# Patient Record
Sex: FEMALE | Race: WHITE | NOT HISPANIC OR LATINO
[De-identification: names, ages, dates, MRNs, and addresses within clinical notes are randomized per-mention and may not be internally consistent; named-entity substitution may affect disease eponyms.]

---

## 2022-02-22 PROBLEM — Z00.00 ENCOUNTER FOR PREVENTIVE HEALTH EXAMINATION: Status: ACTIVE | Noted: 2022-02-22

## 2022-03-31 ENCOUNTER — APPOINTMENT (OUTPATIENT)
Dept: PLASTIC SURGERY | Facility: CLINIC | Age: 22
End: 2022-03-31
Payer: COMMERCIAL

## 2022-03-31 VITALS — HEIGHT: 68 IN | WEIGHT: 140 LBS | BODY MASS INDEX: 21.22 KG/M2

## 2022-03-31 DIAGNOSIS — Z78.9 OTHER SPECIFIED HEALTH STATUS: ICD-10-CM

## 2022-03-31 PROCEDURE — 99203 OFFICE O/P NEW LOW 30 MIN: CPT

## 2022-03-31 RX ORDER — NORETHINDRONE ACETATE AND ETHINYL ESTRADIOL AND FERROUS FUMARATE 1.5-30(21)
KIT ORAL
Refills: 0 | Status: ACTIVE | COMMUNITY

## 2022-03-31 NOTE — PHYSICAL EXAM
[Bra Size: _______] : Bra Size: [unfilled] [de-identified] : well developed female, NAD [de-identified] : NC/AT [de-identified] : supple [de-identified] : unlabored breathing  [de-identified] : Hypertrophic breast b/l L>R with superior deflation, Grade 2 ptosis, nipple sensation intact b/l, no palpable breast nodules or nipple discharge  [de-identified] : RENR [de-identified] : soft, nontender  [de-identified] : shoulder grooving present secondary to bra straps  [de-identified] : FROM in all 4 extremities

## 2022-03-31 NOTE — HISTORY OF PRESENT ILLNESS
[FreeTextEntry1] : 21 yo otherwise healthy F with no significant PMHx who presents today for breast reduction consultation. Patient wears a 34 DD- DDD and c/o dense heavy chest causing neck, shoulder and back pain, shoulder grooving, poor posture and frequent skin rash in the IMF treated multiple times in the past with OTC medications. Patient has seen a chiropractor and a PT for back pain for 2 years with minimal improvement and has been told to consider breast reduction to alleviate symptoms. Also c/o difficulty with working out secondary to heavy chest.\par Denies any personal or family h/o breast disease. No prior mammograms. Denies any breast nodules, nipple discharge or inversion. \par \par Desires a full B- small C\par \par Student - elementary education \par Nonsmoker

## 2022-03-31 NOTE — ASSESSMENT
[FreeTextEntry1] : 21 yo healthy F with symptomatic macromastia who is indicated for reduction mammoplasty. \par \par Patient is a good candidate for breast reduction.  Regarding the procedure, we discussed scarring, poor wound healing, keloid and/or hypertrophic scarring, diminished nipple sensation, diminished ability to breast feed (if appropriate), breast asymmetry, dissatisfaction with the outcome, need for additional surgery and possible nipple loss.  All questions were answered and all risks were understood.\par \par Photos taken with patient permission.\par \par left side larger than right\par \par est 420gm left side\par \par All ?s asnwered\par \par no family history\par doing PT / chiro without improvement\par has seen another plastic surgeon in [place]\par \par Due to COVID 19, pre-visit patient instructions were explained to the patient and their symptoms were checked upon arrival.  \par Masks were used by the health care providers and staff and the examination room was cleaned after the patient visit was completed.\par

## 2022-05-16 ENCOUNTER — OUTPATIENT (OUTPATIENT)
Dept: OUTPATIENT SERVICES | Facility: HOSPITAL | Age: 22
LOS: 1 days | Discharge: HOME | End: 2022-05-16

## 2022-05-16 VITALS
TEMPERATURE: 98 F | HEART RATE: 77 BPM | SYSTOLIC BLOOD PRESSURE: 113 MMHG | DIASTOLIC BLOOD PRESSURE: 70 MMHG | RESPIRATION RATE: 17 BRPM | OXYGEN SATURATION: 99 % | WEIGHT: 139.99 LBS | HEIGHT: 68 IN

## 2022-05-16 DIAGNOSIS — N62 HYPERTROPHY OF BREAST: ICD-10-CM

## 2022-05-16 DIAGNOSIS — Z01.818 ENCOUNTER FOR OTHER PREPROCEDURAL EXAMINATION: ICD-10-CM

## 2022-05-16 DIAGNOSIS — Z98.890 OTHER SPECIFIED POSTPROCEDURAL STATES: Chronic | ICD-10-CM

## 2022-05-16 LAB
ALBUMIN SERPL ELPH-MCNC: 4.9 G/DL — SIGNIFICANT CHANGE UP (ref 3.5–5.2)
ALP SERPL-CCNC: 50 U/L — SIGNIFICANT CHANGE UP (ref 30–115)
ALT FLD-CCNC: 10 U/L — LOW (ref 14–37)
ANION GAP SERPL CALC-SCNC: 14 MMOL/L — SIGNIFICANT CHANGE UP (ref 7–14)
AST SERPL-CCNC: 18 U/L — SIGNIFICANT CHANGE UP (ref 14–37)
BASOPHILS # BLD AUTO: 0.03 K/UL — SIGNIFICANT CHANGE UP (ref 0–0.2)
BASOPHILS NFR BLD AUTO: 0.7 % — SIGNIFICANT CHANGE UP (ref 0–1)
BILIRUB SERPL-MCNC: 0.5 MG/DL — SIGNIFICANT CHANGE UP (ref 0.2–1.2)
BUN SERPL-MCNC: 11 MG/DL — SIGNIFICANT CHANGE UP (ref 10–20)
CALCIUM SERPL-MCNC: 9.7 MG/DL — SIGNIFICANT CHANGE UP (ref 8.5–10.1)
CHLORIDE SERPL-SCNC: 101 MMOL/L — SIGNIFICANT CHANGE UP (ref 98–110)
CO2 SERPL-SCNC: 22 MMOL/L — SIGNIFICANT CHANGE UP (ref 17–32)
CREAT SERPL-MCNC: 0.8 MG/DL — SIGNIFICANT CHANGE UP (ref 0.7–1.5)
EGFR: 108 ML/MIN/1.73M2 — SIGNIFICANT CHANGE UP
EOSINOPHIL # BLD AUTO: 0.06 K/UL — SIGNIFICANT CHANGE UP (ref 0–0.7)
EOSINOPHIL NFR BLD AUTO: 1.3 % — SIGNIFICANT CHANGE UP (ref 0–8)
GLUCOSE SERPL-MCNC: 80 MG/DL — SIGNIFICANT CHANGE UP (ref 70–99)
HCT VFR BLD CALC: 39.6 % — SIGNIFICANT CHANGE UP (ref 37–47)
HGB BLD-MCNC: 12.8 G/DL — SIGNIFICANT CHANGE UP (ref 12–16)
IMM GRANULOCYTES NFR BLD AUTO: 0.2 % — SIGNIFICANT CHANGE UP (ref 0.1–0.3)
LYMPHOCYTES # BLD AUTO: 1.71 K/UL — SIGNIFICANT CHANGE UP (ref 1.2–3.4)
LYMPHOCYTES # BLD AUTO: 38.1 % — SIGNIFICANT CHANGE UP (ref 20.5–51.1)
MCHC RBC-ENTMCNC: 27.7 PG — SIGNIFICANT CHANGE UP (ref 27–31)
MCHC RBC-ENTMCNC: 32.3 G/DL — SIGNIFICANT CHANGE UP (ref 32–37)
MCV RBC AUTO: 85.7 FL — SIGNIFICANT CHANGE UP (ref 81–99)
MONOCYTES # BLD AUTO: 0.42 K/UL — SIGNIFICANT CHANGE UP (ref 0.1–0.6)
MONOCYTES NFR BLD AUTO: 9.4 % — HIGH (ref 1.7–9.3)
NEUTROPHILS # BLD AUTO: 2.26 K/UL — SIGNIFICANT CHANGE UP (ref 1.4–6.5)
NEUTROPHILS NFR BLD AUTO: 50.3 % — SIGNIFICANT CHANGE UP (ref 42.2–75.2)
NRBC # BLD: 0 /100 WBCS — SIGNIFICANT CHANGE UP (ref 0–0)
PLATELET # BLD AUTO: 253 K/UL — SIGNIFICANT CHANGE UP (ref 130–400)
POTASSIUM SERPL-MCNC: 4.4 MMOL/L — SIGNIFICANT CHANGE UP (ref 3.5–5)
POTASSIUM SERPL-SCNC: 4.4 MMOL/L — SIGNIFICANT CHANGE UP (ref 3.5–5)
PROT SERPL-MCNC: 7.7 G/DL — SIGNIFICANT CHANGE UP (ref 6–8)
RBC # BLD: 4.62 M/UL — SIGNIFICANT CHANGE UP (ref 4.2–5.4)
RBC # FLD: 13.5 % — SIGNIFICANT CHANGE UP (ref 11.5–14.5)
SODIUM SERPL-SCNC: 137 MMOL/L — SIGNIFICANT CHANGE UP (ref 135–146)
WBC # BLD: 4.49 K/UL — LOW (ref 4.8–10.8)
WBC # FLD AUTO: 4.49 K/UL — LOW (ref 4.8–10.8)

## 2022-05-16 NOTE — H&P PST ADULT - REASON FOR ADMISSION
21 Y/O FEMALE HERE FOR PRE-ADMISSION SURGICAL TESTING. PATIENT REPORTS NECK AND LOW BACK PAIN D/T LARGE BREASTS X4 YRS. ALSO HAS INDENTATIONS FROM HER BRA STRAPS ON B/L SHOULDERS.  NOW FOR SCHEDULED PROCEDURE.

## 2022-05-16 NOTE — H&P PST ADULT - HISTORY OF PRESENT ILLNESS
PATIENT DENIES CHEST PAIN, SHORTNESS OF BREATH, PALPITATIONS, COUGHING, FEVER, DYSURIA.  CAN WALK UP 6 FLIGHTS OF STEPS WITHOUT SOB.    NO COUGH, FEVER, SORE THROAT, HEADACHE, LOSS OF TASTE OR SMELL. NO KNOWN EXPOSURE TO ANYONE WITH COVID. PATIENT WAS INSTRUCTED TO ISOLATE FROM NOW UNTIL THE SURGERY.    Anesthesia Alert  NO--Difficult Airway  NO--History of neck surgery or radiation  NO--Limited ROM of neck  NO--History of Malignant hyperthermia  NO--Personal or family history of Pseudocholinesterase deficiency  NO--Prior Anesthesia Complication  NO--Latex Allergy  NO--Loose teeth  NO--History of Rheumatoid Arthritis  NO--KELLY  NO-bleeding risk

## 2022-05-18 ENCOUNTER — TRANSCRIPTION ENCOUNTER (OUTPATIENT)
Age: 22
End: 2022-05-18

## 2022-06-02 DIAGNOSIS — M79.2 NEURALGIA AND NEURITIS, UNSPECIFIED: ICD-10-CM

## 2022-06-02 DIAGNOSIS — G89.18 OTHER ACUTE POSTPROCEDURAL PAIN: ICD-10-CM

## 2022-06-02 RX ORDER — GABAPENTIN 300 MG/1
300 CAPSULE ORAL
Qty: 7 | Refills: 0 | Status: ACTIVE | COMMUNITY
Start: 2022-06-02 | End: 1900-01-01

## 2022-06-02 RX ORDER — CELECOXIB 200 MG/1
200 CAPSULE ORAL
Qty: 7 | Refills: 0 | Status: ACTIVE | COMMUNITY
Start: 2022-06-02 | End: 1900-01-01

## 2022-06-03 ENCOUNTER — LABORATORY RESULT (OUTPATIENT)
Age: 22
End: 2022-06-03

## 2022-06-03 NOTE — ASU PATIENT PROFILE, ADULT - NSICDXPASTSURGICALHX_GEN_ALL_CORE_FT
Requested Prescriptions     Pending Prescriptions Disp Refills    temazepam (RESTORIL) 30 mg capsule [Pharmacy Med Name: TEMAZEPAM 30MG      CAP] 30 Cap 2     Sig: TAKE 1 CAPSULE BY MOUTH AT BEDTIME FOR INSOMNIA`    PARoxetine (PAXIL) 40 mg tablet [Pharmacy Med Name: PAROXETINE 40MG     TAB] 90 Tab 1     Sig: TAKE 1 TABLET BY MOUTH ONCE DAILY    clonazePAM (KLONOPIN) 1 mg tablet [Pharmacy Med Name: CLONAZEPAM 1MG      TAB] 90 Tab 1     Sig: TAKE 1/2 TO 1 (ONE-HALF TO ONE) TABLET BY MOUTH TWICE DAILY AND 1 AT BEDTIME     RX refill request from the patient/pharmacy. Patient last seen 09- with labs, and next appt. scheduled for 12-. PAST SURGICAL HISTORY:  History of surgery REMOVAL OF FOREIGN BODY (GLASS) LEFT FOOT

## 2022-06-03 NOTE — ASU PATIENT PROFILE, ADULT - VISION (WITH CORRECTIVE LENSES IF THE PATIENT USUALLY WEARS THEM):
7/7/2017    Jonna [facility] [place] [address]    Dear Jonna:    [facility] wants to ensure your discharge home is safe and you or your loved ones have had all of your questions answered regarding your care after you leave the hospital.    Below is a list of resources and contact information should you have any questions regarding your hospital stay, follow-up instructions, or active medical symptoms.    Questions or Concerns Regarding… Contact   Medical Questions Related to Your Discharge  (7 days a week, 8am-5pm) Contact a Nurse Care Coordinator   [phone]   Medical Questions Not Related to Your Discharge  (24 hours a day / 7 days a week)  Contact the Nurse Health Line   [phone]    Medications or Discharge Instructions Refer to your discharge packet   or contact your [facility] Primary Care Provider   [phone]   Follow-up Appointment(s) Schedule your appointment via Ribbit   or contact Scheduling [phone]   Billing Review your statement via Ribbit  or contact Billing [phone]   Medical Records Review your records via Ribbit   or contact Medical Records [phone]     You may receive a telephone call within two days of discharge. This call is to make certain you understand your discharge instructions and have the opportunity to have any questions answered. You can also easily access your medical information, test results and upcoming appointments via the Ribbit free online health management tool. You can learn more and sign up at [employer]/Ribbit. For assistance setting up your Ribbit account, please call [phone].    Once again, we want to ensure your discharge home is safe and that you have a clear understanding of any next steps in your care. If you have any questions or concerns, please do not hesitate to contact us, we are here for you. Thank you for choosing [facility] for your healthcare needs.    Sincerely,    Your [facility] Healthcare Team          Normal vision: sees adequately in most situations; can see medication labels, newsprint

## 2022-06-03 NOTE — ASU PATIENT PROFILE, ADULT - NSICDXPASTMEDICALHX_GEN_ALL_CORE_FT
PAST MEDICAL HISTORY:  2019 novel coronavirus disease (COVID-19) june 2022/asymptomatic    Celiac disease     Female gynecomastia

## 2022-06-03 NOTE — ASU PATIENT PROFILE, ADULT - FALL HARM RISK - FALL HARM RISK
Dear Derrell,    Here is a summary of your recent test results:  -Hemoglobin is normal.  There is no evidence of anemia.  -LDL(bad) cholesterol level is elevated, and your triglycerides are elevated which can increase your heart disease risk.  A diet high in fat and simple carbohydrates, genetics and being overweight can contribute to this. ADVISE: exercising 150 minutes of aerobic exercise per week (30 minutes for 5 days per week or 50 minutes for 3 days per week are options), eating a low saturated fat/low carbohydrate diet, and omega-3 fatty acids (fish oil) 5672-6893 mg daily are helpful to improve this.  -Glucose (diabetic screening test) is normal.    For additional lab test information, www.testing.com is a very good reference.    Thank you very much for trusting me and [facility].     Have a peaceful day.    Healthy regards,  Kervin Buck MD   No indicators present

## 2022-06-03 NOTE — ASU PATIENT PROFILE, ADULT - FALL HARM RISK - UNIVERSAL INTERVENTIONS
Bed in lowest position, wheels locked, appropriate side rails in place/Call bell, personal items and telephone in reach/Instruct patient to call for assistance before getting out of bed or chair/Non-slip footwear when patient is out of bed/[place] to call system/Physically safe environment - no spills, clutter or unnecessary equipment/Purposeful Proactive Rounding/Room/bathroom lighting operational, light cord in reach

## 2022-06-27 ENCOUNTER — RESULT REVIEW (OUTPATIENT)
Age: 22
End: 2022-06-27

## 2022-06-27 ENCOUNTER — OUTPATIENT (OUTPATIENT)
Dept: OUTPATIENT SERVICES | Facility: HOSPITAL | Age: 22
LOS: 1 days | Discharge: HOME | End: 2022-06-27

## 2022-06-27 ENCOUNTER — TRANSCRIPTION ENCOUNTER (OUTPATIENT)
Age: 22
End: 2022-06-27

## 2022-06-27 ENCOUNTER — APPOINTMENT (OUTPATIENT)
Dept: PLASTIC SURGERY | Facility: AMBULATORY SURGERY CENTER | Age: 22
End: 2022-06-27
Payer: COMMERCIAL

## 2022-06-27 VITALS
RESPIRATION RATE: 16 BRPM | DIASTOLIC BLOOD PRESSURE: 65 MMHG | TEMPERATURE: 100 F | WEIGHT: 139.99 LBS | HEART RATE: 79 BPM | SYSTOLIC BLOOD PRESSURE: 111 MMHG | HEIGHT: 68 IN | OXYGEN SATURATION: 100 %

## 2022-06-27 VITALS
SYSTOLIC BLOOD PRESSURE: 120 MMHG | HEART RATE: 98 BPM | RESPIRATION RATE: 18 BRPM | OXYGEN SATURATION: 96 % | DIASTOLIC BLOOD PRESSURE: 56 MMHG

## 2022-06-27 DIAGNOSIS — Z98.890 OTHER SPECIFIED POSTPROCEDURAL STATES: Chronic | ICD-10-CM

## 2022-06-27 DIAGNOSIS — N62 HYPERTROPHY OF BREAST: ICD-10-CM

## 2022-06-27 PROCEDURE — 19318 BREAST REDUCTION: CPT | Mod: 50

## 2022-06-27 PROCEDURE — 88305 TISSUE EXAM BY PATHOLOGIST: CPT | Mod: 26

## 2022-06-27 RX ORDER — OXYCODONE AND ACETAMINOPHEN 5; 325 MG/1; MG/1
2 TABLET ORAL EVERY 4 HOURS
Refills: 0 | Status: DISCONTINUED | OUTPATIENT
Start: 2022-06-27 | End: 2022-06-27

## 2022-06-27 RX ORDER — CEPHALEXIN 500 MG
1 CAPSULE ORAL
Qty: 10 | Refills: 0
Start: 2022-06-27 | End: 2022-07-01

## 2022-06-27 RX ORDER — ONDANSETRON 8 MG/1
4 TABLET, FILM COATED ORAL ONCE
Refills: 0 | Status: DISCONTINUED | OUTPATIENT
Start: 2022-06-27 | End: 2022-07-12

## 2022-06-27 RX ORDER — TRAMADOL HYDROCHLORIDE 50 MG/1
1 TABLET ORAL
Qty: 20 | Refills: 0
Start: 2022-06-27 | End: 2022-07-01

## 2022-06-27 RX ORDER — OXYCODONE AND ACETAMINOPHEN 5; 325 MG/1; MG/1
1 TABLET ORAL EVERY 4 HOURS
Refills: 0 | Status: DISCONTINUED | OUTPATIENT
Start: 2022-06-27 | End: 2022-06-27

## 2022-06-27 RX ORDER — HYDROMORPHONE HYDROCHLORIDE 2 MG/ML
0.5 INJECTION INTRAMUSCULAR; INTRAVENOUS; SUBCUTANEOUS
Refills: 0 | Status: DISCONTINUED | OUTPATIENT
Start: 2022-06-27 | End: 2022-06-27

## 2022-06-27 RX ORDER — SODIUM CHLORIDE 9 MG/ML
1000 INJECTION, SOLUTION INTRAVENOUS
Refills: 0 | Status: DISCONTINUED | OUTPATIENT
Start: 2022-06-27 | End: 2022-07-12

## 2022-06-27 RX ADMIN — SODIUM CHLORIDE 75 MILLILITER(S): 9 INJECTION, SOLUTION INTRAVENOUS at 17:38

## 2022-06-27 RX ADMIN — OXYCODONE AND ACETAMINOPHEN 1 TABLET(S): 5; 325 TABLET ORAL at 18:44

## 2022-06-27 NOTE — ASU DISCHARGE PLAN (ADULT/PEDIATRIC) - ASU DC SPECIAL INSTRUCTIONSFT
Diet: You may resume your usual diet. Avoid alcohol and excessive salt intake for two weeks following surgery. This will help to minimize swelling.    Medication: Continue Gabapentin and Celebrex twice daily until complete. Take Tylenol 650mg every 6 hours. If pain is still not well controlled, take Tramadol as needed. Take all antibiotics as prescribed. Remember, no Aspirin or NSAIDS (Advil, Motrin, Aleve) as they may increase bruising.    Activity: You may take care of your personal needs as desired, however, no lifting or strenuous activity is allowed for 4 weeks following surgery. Driving is not permitted for at least two weeks.    Wound care: Keep your dressing clean, dry, and intact until seen by MD/PA. Wear the surgical bra which will be provided to you at all times. Do not smoke! It delays wound healing and increases the risk of complications.    Personal Hygiene: Do not get the operative area wet. You are allowed to sponge bathe. Do not remove the surgical bra. No tub soaking.    Things to expect: The operative area may be bruised, swollen, and painful. You may also have temporary numbness which will improve over time.    In case of emergency: call the office any time day or night. Post-operative care will be provided in the office one week following surgery. If you do not already have an appointment, please call during regular office hours to schedule: [phone].     We wish you a pleasant recovery.

## 2022-06-27 NOTE — ASU DISCHARGE PLAN (ADULT/PEDIATRIC) - PATIENT EDUCATION MATERIALS PROVIED
Provider pre-printed instructions given pain management/Provider pre-printed instructions given/Pre-printed instructions given for other (specify)

## 2022-06-27 NOTE — CHART NOTE - NSCHARTNOTEFT_GEN_A_CORE
PACU ANESTHESIA ADMISSION NOTE      Procedure: Breast reduction    Breast reduction      Post op diagnosis:  Macromastia        __x__  Patent Airway    __x__  Full return of protective reflexes    ____  Full recovery from anesthesia / back to baseline     Vitals:   see anesthesia record      Mental Status:  __x__ Awake   _____ Alert   _____ Drowsy   _____ Sedated    Nausea/Vomiting:  __x__ NO  ______Yes,   See Post - Op Orders          Pain Scale (0-10):  _____    Treatment: ____ None    ___x_ See Post - Op/PCA Orders    Post - Operative Fluids:   ____ Oral   __x__ See Post - Op Orders    Plan: Discharge:   __x__Home       _____Floor     _____Critical Care    _____  Other:_________________    Comments:  Uneventful intraoperative course. No anesthesia issues or complications noted. Patient stable upon arrival to PACU. Report given to RN. Discharge when criteria met.

## 2022-06-27 NOTE — ASU DISCHARGE PLAN (ADULT/PEDIATRIC) - NS MD DC FALL RISK RISK
For information on Fall & Injury Prevention, visit: [email or web address] OR  [email or web address] OR  https://www.cdc.gov/steadi/patient.html

## 2022-06-27 NOTE — ASU DISCHARGE PLAN (ADULT/PEDIATRIC) - FOLLOW UP APPOINTMENTS
[facility],  Endoscopy/Ambulatory Surgery [facility]:  [facility]

## 2022-06-27 NOTE — ASU DISCHARGE PLAN (ADULT/PEDIATRIC) - CARE PROVIDER_API CALL
Deshawn Morton)  Plastic Surgery; Surgery of the Hand  [address] [place] [address], [address]  [place] [address]  Phone: [phone]  Fax: [phone]  Follow Up Time:

## 2022-06-27 NOTE — BRIEF OPERATIVE NOTE - NSICDXBRIEFPROCEDURE_GEN_ALL_CORE_FT
PROCEDURES:  Breast reduction 27-Jun-2022 17:17:01  Deshawn Morton  Breast reduction 27-Jun-2022 17:17:07  Deshawn Morton

## 2022-06-29 DIAGNOSIS — K90.41 NON-CELIAC GLUTEN SENSITIVITY: ICD-10-CM

## 2022-06-29 DIAGNOSIS — N62 HYPERTROPHY OF BREAST: ICD-10-CM

## 2022-06-29 DIAGNOSIS — Z86.16 PERSONAL HISTORY OF COVID-19: ICD-10-CM

## 2022-06-30 LAB — SURGICAL PATHOLOGY STUDY: SIGNIFICANT CHANGE UP

## 2022-07-05 ENCOUNTER — APPOINTMENT (OUTPATIENT)
Dept: PLASTIC SURGERY | Facility: CLINIC | Age: 22
End: 2022-07-05

## 2022-07-05 PROBLEM — U07.1 COVID-19: Chronic | Status: ACTIVE | Noted: 2022-06-27

## 2022-07-05 PROBLEM — K90.0 CELIAC DISEASE: Chronic | Status: ACTIVE | Noted: 2022-05-16

## 2022-07-05 PROBLEM — N62 HYPERTROPHY OF BREAST: Chronic | Status: ACTIVE | Noted: 2022-06-27

## 2022-07-05 PROCEDURE — 99024 POSTOP FOLLOW-UP VISIT: CPT

## 2022-07-05 NOTE — PHYSICAL EXAM
[de-identified] : well developed female, NAD [de-identified] : unlabored breathing  [de-identified] : RENR [de-identified] : Bilateral breasts soft with scattered bruising and swelling, incisions healing well, c/d/i, diminished nipple sensation b/l, no erythema or fluid collection

## 2022-07-05 NOTE — HISTORY OF PRESENT ILLNESS
[FreeTextEntry1] : 23 yo otherwise healthy F with no significant PMHx who presents today for breast reduction consultation. Patient wears a 34 DD- DDD and c/o dense heavy chest causing neck, shoulder and back pain, shoulder grooving, poor posture and frequent skin rash in the IMF treated multiple times in the past with OTC medications. Patient has seen a chiropractor and a PT for back pain for 2 years with minimal improvement and has been told to consider breast reduction to alleviate symptoms. Also c/o difficulty with working out secondary to heavy chest.\par Denies any personal or family h/o breast disease. No prior mammograms. Denies any breast nodules, nipple discharge or inversion. \par \par Desires a full B- small C\par \par Student - elementary education \par Nonsmoker \par \par Interval hx (7/5/22). Patient presents today POD#8 s/p BBR. Doing well with normal incisional discomfort. Completed all prescribed medications. Denies any f/c or bleeding.

## 2022-07-05 NOTE — DATA REVIEWED
[FreeTextEntry1] : Pathology             Final\par \par No Documents Attached\par \par \par \par   [facility] Accession Number : [id number]\par Patient:   MEHRAN MAYES\par \par \par Accession:                             [id number]\par \par Collected Date/Time:                   6/27/2022 16:12 EDT\par Received Date/Time:                    6/27/2022 16:49 EDT\par \par Surgical Pathology Report - Auth (Verified)\par \par Specimen(s) Submitted\par 1  Right breast tissue\par Time obtained: 4:12 pm\par 2  Left breast tissue\par Time obtained: 4:13 pm\par \par Final Diagnosis\par 1.  Breast, right tissue and skin, reduction:\par \par - Benign predominantly fibrous breast tissue and overlying skin, both\par without histopathologic abnormality; 263 g.\par \par 2.  Breast, left tissue and skin, reduction:\par - Benign predominantly fibrous breast tissue and overlying skin, both\par without histopathologic abnormality; 537 g.\par Verified by: Sylvester Carter M.D.\par (Electronic Signature)\par Reported on: 06/30/22 11:24 EDT, [facility],\par [address] [place] [id number]\par Phone: [phone]   Fax: [phone]\par _________________________________________________________________\par \par \par Clinical Information\par Bilateral breast reduction\par COVID (-)\par \par Perioperative Diagnosis\par Macromastia\par \par Gross Description\par 1. The specimen is received in formalin, labeled "right breast tissue 277\par g" and consists of multiple fibroadipose tissue, measuring in aggregate,\par 15 x 12 x 3 cm and weighing 263 g.  The specimen is serially sectioned.\par The cut surfaces are grossly unremarkable.  Representative sections are\par submitted.  (2 blocks)\par \par 2. The specimen is received in formalin, labeled " left breast tissue 546\par g" and consists of multiple fibroadipose tissue, measuring in aggregate,\par 18 x 15 x 2 cm and weighing 537 g.  The specimen is serially sectioned.\par The cut surfaces are grossly unremarkable.  Representative sections are\par submitted.  (2 blocks)\par \par Specimen was received and underwent gross examination at [place]\par \par [place] [address].\par \par 06/27/2022 17:11:41 EDT bc\par \par  \par \par  Ordered by: ELBA BALDERAS IV       Collected/Examined: 27Jun2022 04:12PM       \par Verification Required       Stage: Final       \par  Performed at: [facility]       Resulted: 30Jun2022 11:24AM       Last Updated: 30Jun2022 11:24AM       Accession: [id number]

## 2022-07-05 NOTE — ASSESSMENT
[FreeTextEntry1] : 21 yo healthy F with symptomatic macromastia now POD#8 s/p BBR. Doing well. \par \par - all dressings changed\par - may shower\par - continue surgical bra, may switch to a sports bra\par - pathology discussed - benign\par - post-op instructions reviewed and all questions answered\par - f/u next week for suture removal and scar management \par \par Due to COVID 19, pre-visit patient instructions were explained to the patient and their symptoms were checked upon arrival.  \par Masks were used by the health care providers and staff and the examination room was cleaned after the patient visit was completed.\par

## 2022-07-12 ENCOUNTER — APPOINTMENT (OUTPATIENT)
Dept: PLASTIC SURGERY | Facility: CLINIC | Age: 22
End: 2022-07-12

## 2022-07-12 NOTE — ASSESSMENT
[FreeTextEntry1] : 21 yo healthy F with symptomatic macromastia now 2 weeks s/p BBR. Doing well. \par \par - d/c steri strips, aquaphor to entire incision 7-10 days then ok to switch to scar cream. Importance of avoiding sun exposure / SPF reviewed\par - L lateral resolving ecchymosis: heat and massage multiple times a day. Continue sports bra 24/7, can add additional lateral compression\par - Light activity ok, continue with lifting restrictions\par - Breast size and NAC asymmetry: Reassurance that Jordans breasts will continue to decrease in size as normal post op swelling diminishes provided. We will continue to monitor NAC progress over the next few months and can discuss surgical options if necessary at that time. Pt and mother relieved after lengthy conversations with myself and . \par - post-op instructions reviewed and all questions answered\par - f/u 3 weeks. 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 \par \par Due to COVID 19, pre-visit patient instructions were explained to the patient and their symptoms were checked upon arrival.  Masks were used by the health care providers and staff and the examination room was cleaned after the patient visit was completed.\par

## 2022-07-12 NOTE — DATA REVIEWED
[FreeTextEntry1] : Pathology             Final\par \par No Documents Attached\par \par [facility] Accession Number : [id number]\par Patient:   MEHRAN MAYES\par \par Accession:                             [id number]\par \par Collected Date/Time:                   6/27/2022 16:12 EDT\par Received Date/Time:                    6/27/2022 16:49 EDT\par \par Surgical Pathology Report - Auth (Verified)\par \par Specimen(s) Submitted\par 1  Right breast tissue\par Time obtained: 4:12 pm\par 2  Left breast tissue\par Time obtained: 4:13 pm\par \par Final Diagnosis\par 1.  Breast, right tissue and skin, reduction:\par \par - Benign predominantly fibrous breast tissue and overlying skin, both\par without histopathologic abnormality; 263 g.\par \par 2.  Breast, left tissue and skin, reduction:\par - Benign predominantly fibrous breast tissue and overlying skin, both\par without histopathologic abnormality; 537 g.\par Verified by: Sylvester Carter M.D.\par (Electronic Signature)\par Reported on: 06/30/22 11:24 EDT, [facility],\par [address] [place] [address]\par Phone: [phone]   Fax: [phone]\par _________________________________________________________________\par \par \par Clinical Information\par Bilateral breast reduction\par COVID (-)\par \par Perioperative Diagnosis\par Macromastia\par \par Gross Description\par 1. The specimen is received in formalin, labeled "right breast tissue 277\par g" and consists of multiple fibroadipose tissue, measuring in aggregate,\par 15 x 12 x 3 cm and weighing 263 g.  The specimen is serially sectioned.\par The cut surfaces are grossly unremarkable.  Representative sections are\par submitted.  (2 blocks)\par \par 2. The specimen is received in formalin, labeled " left breast tissue 546\par g" and consists of multiple fibroadipose tissue, measuring in aggregate,\par 18 x 15 x 2 cm and weighing 537 g.  The specimen is serially sectioned.\par The cut surfaces are grossly unremarkable.  Representative sections are\par submitted.  (2 blocks)\par \par Specimen was received and underwent gross examination at [place]\par \par [facility], [address] [place] [address].\par \par 06/27/2022 17:11:41 EDT bc\par \par  \par \par  Ordered by: ELBA BALDERAS IV       Collected/Examined: 27Jun2022 04:12PM       \par Verification Required       Stage: Final       \par  Performed at: [facility]       Resulted: 30Jun2022 11:24AM       Last Updated: 30Jun2022 11:24AM       Accession: [id number]

## 2022-07-12 NOTE — HISTORY OF PRESENT ILLNESS
[FreeTextEntry1] : 23 yo otherwise healthy F with no significant PMHx who presents today for breast reduction consultation. Patient wears a 34 DD- DDD and c/o dense heavy chest causing neck, shoulder and back pain, shoulder grooving, poor posture and frequent skin rash in the IMF treated multiple times in the past with OTC medications. Patient has seen a chiropractor and a PT for back pain for 2 years with minimal improvement and has been told to consider breast reduction to alleviate symptoms. Also c/o difficulty with working out secondary to heavy chest.\par Denies any personal or family h/o breast disease. No prior mammograms. Denies any breast nodules, nipple discharge or inversion. \par \par Desires a full B- small C\par \par Student - elementary education \par Nonsmoker \par \par Interval hx (7/5/22). Patient presents today POD#8 s/p BBR. Doing well with normal incisional discomfort. Completed all prescribed medications. Denies any f/c or bleeding.   \par \par Interval hx (7/12/22): Pt 2 weeks s/p BBR. Pt here with mother. Both visibly upset & tearful c/o NAC asymmetry, L higher than R & pt larger than she expected. Pt also c/o TTP on left lateral chest. Denies f/c, bleeding. 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

## 2022-07-12 NOTE — PHYSICAL EXAM
[de-identified] : well developed female, NAD [de-identified] : unlabored breathing  [de-identified] : RENR [de-identified] : Bilateral breasts soft, incisions healing well, c/d/i,  no erythema. Area of resolving ecchymosis left lateral breast, no palpable fluid collection. Breasts with good symmetry, L NAC to IMF 9.75cm, R NAC to IMF 9cm, diminished nipple sensation b/l, [de-identified] : Soft NT ND

## 2022-07-28 ENCOUNTER — APPOINTMENT (OUTPATIENT)
Dept: PLASTIC SURGERY | Facility: CLINIC | Age: 22
End: 2022-07-28

## 2022-07-28 PROCEDURE — 99024 POSTOP FOLLOW-UP VISIT: CPT

## 2022-07-28 NOTE — PHYSICAL EXAM
[de-identified] : unlabored breathing  [de-identified] : RENR [de-identified] : well developed female, NAD [de-identified] : Bilateral breasts soft, incisions healing well, c/d/i,  no erythema or signs of HTS. R breast slight > L, no palpable fluid collection. Breasts with good symmetry, L NAC to IMF 9.75cm, R NAC to IMF 9cm, nipple sensation b/l returning  [de-identified] : Soft NT ND

## 2022-07-28 NOTE — DATA REVIEWED
[FreeTextEntry1] : Pathology             Final\par \par No Documents Attached\par \par [facility] Accession Number : [id number]\par Patient:   MEHRAN MAYES\par \par Accession:                             [id number]\par \par Collected Date/Time:                   6/27/2022 16:12 EDT\par Received Date/Time:                    6/27/2022 16:49 EDT\par \par Surgical Pathology Report - Auth (Verified)\par \par Specimen(s) Submitted\par 1  Right breast tissue\par Time obtained: 4:12 pm\par 2  Left breast tissue\par Time obtained: 4:13 pm\par \par Final Diagnosis\par 1.  Breast, right tissue and skin, reduction:\par \par - Benign predominantly fibrous breast tissue and overlying skin, both\par without histopathologic abnormality; 263 g.\par \par 2.  Breast, left tissue and skin, reduction:\par - Benign predominantly fibrous breast tissue and overlying skin, both\par without histopathologic abnormality; 537 g.\par Verified by: Sylvester Carter M.D.\par (Electronic Signature)\par Reported on: 06/30/22 11:24 EDT, [facility],\par [address] [place] [address]\par Phone: [phone]   Fax: [phone]\par _________________________________________________________________\par \par \par Clinical Information\par Bilateral breast reduction\par COVID (-)\par \par Perioperative Diagnosis\par Macromastia\par \par Gross Description\par 1. The specimen is received in formalin, labeled "right breast tissue 277\par g" and consists of multiple fibroadipose tissue, measuring in aggregate,\par 15 x 12 x 3 cm and weighing 263 g.  The specimen is serially sectioned.\par The cut surfaces are grossly unremarkable.  Representative sections are\par submitted.  (2 blocks)\par \par 2. The specimen is received in formalin, labeled " left breast tissue 546\par g" and consists of multiple fibroadipose tissue, measuring in aggregate,\par 18 x 15 x 2 cm and weighing 537 g.  The specimen is serially sectioned.\par The cut surfaces are grossly unremarkable.  Representative sections are\par submitted.  (2 blocks)\par \par Specimen was received and underwent gross examination at [place]\par \par [facility], [address] [place] [address].\par \par 06/27/2022 17:11:41 EDT bc\par \par  \par \par  Ordered by: ELBA BALDERAS IV       Collected/Examined: 27Jun2022 04:12PM       \par Verification Required       Stage: Final       \par  Performed at: [facility]       Resulted: 30Jun2022 11:24AM       Last Updated: 30Jun2022 11:24AM       Accession: [id number]

## 2022-07-28 NOTE — HISTORY OF PRESENT ILLNESS
[FreeTextEntry1] : 23 yo otherwise healthy F with no significant PMHx who presents today for breast reduction consultation. Patient wears a 34 DD- DDD and c/o dense heavy chest causing neck, shoulder and back pain, shoulder grooving, poor posture and frequent skin rash in the IMF treated multiple times in the past with OTC medications. Patient has seen a chiropractor and a PT for back pain for 2 years with minimal improvement and has been told to consider breast reduction to alleviate symptoms. Also c/o difficulty with working out secondary to heavy chest.\par Denies any personal or family h/o breast disease. No prior mammograms. Denies any breast nodules, nipple discharge or inversion. \par \par Desires a full B- small C\par \par Student - elementary education \par Nonsmoker \par \par Interval hx (7/5/22). Patient presents today POD#8 s/p BBR. Doing well with normal incisional discomfort. Completed all prescribed medications. Denies any f/c or bleeding.   \par \par Interval hx (7/12/22): Pt 2 weeks s/p BBR. Pt here with mother. Both visibly upset & tearful c/o NAC asymmetry, L higher than R & pt larger than she expected. Pt also c/o TTP on left lateral chest. Denies f/c, bleeding. 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 \par \par Interval hx (7/28/22): Pt 4.5 weeks s/p BBR, here for clearance to return to full activity as she starts dance school next week. Here with mother at beside. In better spirits than last visit however c/o R breast now more swollen than left, continues with with c/o NAC asymmetry. Denies any fever/chills/drainage or issues with the incisions.

## 2022-07-28 NOTE — PHYSICAL EXAM
[de-identified] : unlabored breathing  [de-identified] : RENR [de-identified] : well developed female, NAD [de-identified] : Bilateral breasts soft, incisions healing well, c/d/i,  no erythema or signs of HTS. R breast slight > L, no palpable fluid collection. Breasts with good symmetry, L NAC to IMF 9.75cm, R NAC to IMF 9cm, nipple sensation b/l returning  [de-identified] : Soft NT ND

## 2022-07-28 NOTE — DATA REVIEWED
[FreeTextEntry1] : Pathology             Final\par \par No Documents Attached\par \par [facility] Accession Number : [id number]\par Patient:   MEHRAN MAYES\par \par Accession:                             [id number]\par \par Collected Date/Time:                   6/27/2022 16:12 EDT\par Received Date/Time:                    6/27/2022 16:49 EDT\par \par Surgical Pathology Report - Auth (Verified)\par \par Specimen(s) Submitted\par 1  Right breast tissue\par Time obtained: 4:12 pm\par 2  Left breast tissue\par Time obtained: 4:13 pm\par \par Final Diagnosis\par 1.  Breast, right tissue and skin, reduction:\par \par - Benign predominantly fibrous breast tissue and overlying skin, both\par without histopathologic abnormality; 263 g.\par \par 2.  Breast, left tissue and skin, reduction:\par - Benign predominantly fibrous breast tissue and overlying skin, both\par without histopathologic abnormality; 537 g.\par Verified by: Sylvester Carter M.D.\par (Electronic Signature)\par Reported on: 06/30/22 11:24 EDT, [facility],\par [address] [place] [address]\par Phone: [phone]   Fax: [phone]\par _________________________________________________________________\par \par \par Clinical Information\par Bilateral breast reduction\par COVID (-)\par \par Perioperative Diagnosis\par Macromastia\par \par Gross Description\par 1. The specimen is received in formalin, labeled "right breast tissue 277\par g" and consists of multiple fibroadipose tissue, measuring in aggregate,\par 15 x 12 x 3 cm and weighing 263 g.  The specimen is serially sectioned.\par The cut surfaces are grossly unremarkable.  Representative sections are\par submitted.  (2 blocks)\par \par 2. The specimen is received in formalin, labeled " left breast tissue 546\par g" and consists of multiple fibroadipose tissue, measuring in aggregate,\par 18 x 15 x 2 cm and weighing 537 g.  The specimen is serially sectioned.\par The cut surfaces are grossly unremarkable.  Representative sections are\par submitted.  (2 blocks)\par \par Specimen was received and underwent gross examination at [place]\par \par [facility], [address] [place] [address].\par \par 06/27/2022 17:11:41 EDT bc\par \par  \par \par  Ordered by: ELBA BALDERAS IV       Collected/Examined: 27Jun2022 04:12PM       \par Verification Required       Stage: Final       \par  Performed at: [facility]       Resulted: 30Jun2022 11:24AM       Last Updated: 30Jun2022 11:24AM       Accession: [id number]

## 2022-07-28 NOTE — ASSESSMENT
[FreeTextEntry1] : 23 yo healthy F with symptomatic macromastia now 4.5 weeks s/p BBR. Doing well. \par \par -Scar management discussed, continue scar cream\par - R breast swelling: Continue compression bra with abd pad on R lateral chest. S/s of fluid collection and when to call reviewed\par - Cleared to return to full activity without restrictions\par - Breast size and NAC asymmetry: Reassurance provided.  We will continue to monitor NAC progress over the next few months and can discuss surgical options if necessary at that time. Pt and mother relieved after lengthy conversations with myself and . \par - All questions answered\par - f/u 3M\par \par Due to COVID 19, pre-visit patient instructions were explained to the patient and their symptoms were checked upon arrival.  Masks were used by the health care providers and staff and the examination room was cleaned after the patient visit was completed.\par

## 2022-10-06 ENCOUNTER — APPOINTMENT (OUTPATIENT)
Dept: PLASTIC SURGERY | Facility: CLINIC | Age: 22
End: 2022-10-06

## 2022-10-06 PROCEDURE — 99212 OFFICE O/P EST SF 10 MIN: CPT

## 2022-10-06 NOTE — HISTORY OF PRESENT ILLNESS
[FreeTextEntry1] : 23 yo otherwise healthy F with no significant PMHx who presents today for breast reduction consultation. Patient wears a 34 DD- DDD and c/o dense heavy chest causing neck, shoulder and back pain, shoulder grooving, poor posture and frequent skin rash in the IMF treated multiple times in the past with OTC medications. Patient has seen a chiropractor and a PT for back pain for 2 years with minimal improvement and has been told to consider breast reduction to alleviate symptoms. Also c/o difficulty with working out secondary to heavy chest.\par Denies any personal or family h/o breast disease. No prior mammograms. Denies any breast nodules, nipple discharge or inversion. \par \par Desires a full B- small C\par \par Student - elementary education \par Nonsmoker \par \par Interval hx (7/5/22). Patient presents today POD#8 s/p BBR. Doing well with normal incisional discomfort. Completed all prescribed medications. Denies any f/c or bleeding.   \par \par Interval hx (7/12/22): Pt 2 weeks s/p BBR. Pt here with mother. Both visibly upset & tearful c/o NAC asymmetry, L higher than R & pt larger than she expected. Pt also c/o TTP on left lateral chest. Denies f/c, bleeding. 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 \par \par Interval hx (7/28/22): Pt 1 month s/p BBR, here for clearance to return to full activity as she starts dance school next week. \par \par Interval hx (10/6/22). Pt here 3.5 months s/p BBR. Doing well but concerned with breast and NAC asymmetry R>L. Pt prefers the size and shape of left breast but does not like the superior nipple position.

## 2022-10-06 NOTE — ASSESSMENT
[FreeTextEntry1] : 21 yo healthy F with symptomatic macromastia now 3.5 months s/p BBR. Doing well. \par Breast asymmetry.\par \par - continue scar creams daily \par - consider revision \par \par Due to COVID 19, pre-visit patient instructions were explained to the patient and their symptoms were checked upon arrival.  Masks were used by the health care providers and staff and the examination room was cleaned after the patient visit was completed.\par \par \par \par 10/6/2022\par pt seen/examined\par father present (stepped out during exam)\par \par I agree w patient concners:--left NAC higher than right and rigth side slightly larger than left\par \par Will be able to address both of these issues when excise the tender breast scar tissue \par \par Photos taken with patient permission.\par \par All ?s asnwerwed\par \par Pt wishes for March 2023 (due to competitive dance schedule)\par

## 2022-10-06 NOTE — PHYSICAL EXAM
[de-identified] : well developed female, NAD [de-identified] : unlabored breathing  [de-identified] : RENR [de-identified] : Bilateral breasts soft, incisions healing well, c/d/i,  no erythema or fluid collection, asymmetry R>L, left nipple more superior than right nipple, diminished nipple sensation left breast

## 2022-10-06 NOTE — DATA REVIEWED
[FreeTextEntry1] : Pathology             Final\par \par No Documents Attached\par \par [facility] Accession Number : [id number]\par Patient:   MEHRAN MAYES\par \par Accession:                             [id number]\par \par Collected Date/Time:                   6/27/2022 16:12 EDT\par Received Date/Time:                    6/27/2022 16:49 EDT\par \par Surgical Pathology Report - Auth (Verified)\par \par Specimen(s) Submitted\par 1  Right breast tissue\par Time obtained: 4:12 pm\par 2  Left breast tissue\par Time obtained: 4:13 pm\par \par Final Diagnosis\par 1.  Breast, right tissue and skin, reduction:\par \par - Benign predominantly fibrous breast tissue and overlying skin, both\par without histopathologic abnormality; 263 g.\par \par 2.  Breast, left tissue and skin, reduction:\par - Benign predominantly fibrous breast tissue and overlying skin, both\par without histopathologic abnormality; 537 g.\par Verified by: Sylvester Carter M.D.\par (Electronic Signature)\par Reported on: 06/30/22 11:24 EDT, [facility],\par [address] [place] [address]\par Phone: [phone]   Fax: [phone]\par _________________________________________________________________\par \par \par Clinical Information\par Bilateral breast reduction\par COVID (-)\par \par Perioperative Diagnosis\par Macromastia\par \par Gross Description\par 1. The specimen is received in formalin, labeled "right breast tissue 277\par g" and consists of multiple fibroadipose tissue, measuring in aggregate,\par 15 x 12 x 3 cm and weighing 263 g.  The specimen is serially sectioned.\par The cut surfaces are grossly unremarkable.  Representative sections are\par submitted.  (2 blocks)\par \par 2. The specimen is received in formalin, labeled " left breast tissue 546\par g" and consists of multiple fibroadipose tissue, measuring in aggregate,\par 18 x 15 x 2 cm and weighing 537 g.  The specimen is serially sectioned.\par The cut surfaces are grossly unremarkable.  Representative sections are\par submitted.  (2 blocks)\par \par Specimen was received and underwent gross examination at [place]\par \par [facility], [address] [place] [address].\par \par 06/27/2022 17:11:41 EDT bc\par \par  \par \par  Ordered by: ELBA BALDERAS IV       Collected/Examined: 27Jun2022 04:12PM       \par Verification Required       Stage: Final       \par  Performed at: [facility]       Resulted: 30Jun2022 11:24AM       Last Updated: 30Jun2022 11:24AM       Accession: [id number]

## 2023-02-07 ENCOUNTER — APPOINTMENT (OUTPATIENT)
Dept: PLASTIC SURGERY | Facility: CLINIC | Age: 23
End: 2023-02-07
Payer: COMMERCIAL

## 2023-02-07 PROCEDURE — 99212 OFFICE O/P EST SF 10 MIN: CPT

## 2023-02-07 NOTE — HISTORY OF PRESENT ILLNESS
[FreeTextEntry1] : 21 yo otherwise healthy F with no significant PMHx who presents today for breast reduction consultation. Patient wears a 34 DD- DDD and c/o dense heavy chest causing neck, shoulder and back pain, shoulder grooving, poor posture and frequent skin rash in the IMF treated multiple times in the past with OTC medications. Patient has seen a chiropractor and a PT for back pain for 2 years with minimal improvement and has been told to consider breast reduction to alleviate symptoms. Also c/o difficulty with working out secondary to heavy chest.\par Denies any personal or family h/o breast disease. No prior mammograms. Denies any breast nodules, nipple discharge or inversion. \par \par Desires a full B- small C\par \par Student - elementary education \par Nonsmoker \par \par Interval hx (7/5/22). Patient presents today POD#8 s/p BBR. Doing well with normal incisional discomfort. Completed all prescribed medications. Denies any f/c or bleeding.   \par \par Interval hx (7/12/22): Pt 2 weeks s/p BBR. Pt here with mother. Both visibly upset & tearful c/o NAC asymmetry, L higher than R & pt larger than she expected. Pt also c/o TTP on left lateral chest. Denies f/c, bleeding. 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 \par \par Interval hx (7/28/22): Pt 1 month s/p BBR, here for clearance to return to full activity as she starts dance school next week. \par \par Interval hx (10/6/22). Pt here 3.5 months s/p BBR. Doing well but concerned with breast and NAC asymmetry R>L. Pt prefers the size and shape of left breast but does not like the superior nipple position.

## 2023-02-07 NOTE — DATA REVIEWED
[FreeTextEntry1] : Pathology             Final\par \par No Documents Attached\par \par [facility] Accession Number : [id number]\par Patient:   MEHRAN MAYES\par \par Accession:                             [id number]\par \par Collected Date/Time:                   6/27/2022 16:12 EDT\par Received Date/Time:                    6/27/2022 16:49 EDT\par \par Surgical Pathology Report - Auth (Verified)\par \par Specimen(s) Submitted\par 1  Right breast tissue\par Time obtained: 4:12 pm\par 2  Left breast tissue\par Time obtained: 4:13 pm\par \par Final Diagnosis\par 1.  Breast, right tissue and skin, reduction:\par \par - Benign predominantly fibrous breast tissue and overlying skin, both\par without histopathologic abnormality; 263 g.\par \par 2.  Breast, left tissue and skin, reduction:\par - Benign predominantly fibrous breast tissue and overlying skin, both\par without histopathologic abnormality; 537 g.\par Verified by: Sylvester Carter M.D.\par (Electronic Signature)\par Reported on: 06/30/22 11:24 EDT, [facility],\par [address] [place] [address]\par Phone: [phone]   Fax: [phone]\par _________________________________________________________________\par \par \par Clinical Information\par Bilateral breast reduction\par COVID (-)\par \par Perioperative Diagnosis\par Macromastia\par \par Gross Description\par 1. The specimen is received in formalin, labeled "right breast tissue 277\par g" and consists of multiple fibroadipose tissue, measuring in aggregate,\par 15 x 12 x 3 cm and weighing 263 g.  The specimen is serially sectioned.\par The cut surfaces are grossly unremarkable.  Representative sections are\par submitted.  (2 blocks)\par \par 2. The specimen is received in formalin, labeled " left breast tissue 546\par g" and consists of multiple fibroadipose tissue, measuring in aggregate,\par 18 x 15 x 2 cm and weighing 537 g.  The specimen is serially sectioned.\par The cut surfaces are grossly unremarkable.  Representative sections are\par submitted.  (2 blocks)\par \par Specimen was received and underwent gross examination at [place]\par \par [facility], [address] [place] [address].\par \par 06/27/2022 17:11:41 EDT bc\par \par  \par \par  Ordered by: ELBA BALDERAS IV       Collected/Examined: 27Jun2022 04:12PM       \par Verification Required       Stage: Final       \par  Performed at: [facility]       Resulted: 30Jun2022 11:24AM       Last Updated: 30Jun2022 11:24AM       Accession: [id number]

## 2023-02-07 NOTE — PHYSICAL EXAM
[de-identified] : well developed female, NAD [de-identified] : unlabored breathing  [de-identified] : RENR [de-identified] : Bilateral breasts soft, incisions healing well, c/d/i,  no erythema or fluid collection, asymmetry R>L, left nipple more superior than right nipple, diminished nipple sensation left breast

## 2023-02-07 NOTE — ASSESSMENT
[FreeTextEntry1] : 23 yo healthy F with symptomatic macromastia now 3.5 months s/p BBR. Doing well. \par Breast asymmetry.\par \par - continue scar creams daily \par - consider revision \par \par Due to COVID 19, pre-visit patient instructions were explained to the patient and their symptoms were checked upon arrival.  Masks were used by the health care providers and staff and the examination room was cleaned after the patient visit was completed.\par \par \par \par 10/6/2022\par pt seen/examined\par father present (stepped out during exam)\par \par I agree w patient concners:--left NAC higher than right and rigth side slightly larger than left\par \par Will be able to address both of these issues when excise the tender breast scar tissue \par \par Photos taken with patient permission.\par \par All ?s asnwerwed\par \par Pt wishes for March 2023 (due to competitive dance schedule)\par \par \par 2/7/2023\par here w preop ?s\par \par concerns remain similar--left NAC higher, right side slightly larger\par new is right medial halg of NAC w slighly hypertrophic scar tissue\par \par can address all during upcoming revision\par \par Due to COVID 19, pre-visit patient instructions were explained to the patient and their symptoms were checked upon arrival.  \par Masks were used by the health care providers and staff and the examination room was cleaned after the patient visit was completed.\par \par

## 2023-02-10 ENCOUNTER — EMERGENCY (EMERGENCY)
Facility: HOSPITAL | Age: 23
LOS: 1 days | Discharge: HOME | End: 2023-02-10
Attending: INTERNAL MEDICINE | Admitting: INTERNAL MEDICINE
Payer: COMMERCIAL

## 2023-02-10 VITALS
WEIGHT: 132.94 LBS | TEMPERATURE: 98 F | RESPIRATION RATE: 18 BRPM | DIASTOLIC BLOOD PRESSURE: 86 MMHG | HEART RATE: 78 BPM | HEIGHT: 68 IN | SYSTOLIC BLOOD PRESSURE: 134 MMHG | OXYGEN SATURATION: 100 %

## 2023-02-10 DIAGNOSIS — Z98.890 OTHER SPECIFIED POSTPROCEDURAL STATES: Chronic | ICD-10-CM

## 2023-02-10 LAB
ALBUMIN SERPL ELPH-MCNC: 4 G/DL — SIGNIFICANT CHANGE UP (ref 3.3–5)
ALT FLD-CCNC: 20 U/L — SIGNIFICANT CHANGE UP (ref 12–78)
ANION GAP SERPL CALC-SCNC: 5 MMOL/L — SIGNIFICANT CHANGE UP (ref 5–17)
APPEARANCE UR: CLEAR — SIGNIFICANT CHANGE UP
AST SERPL-CCNC: 16 U/L — SIGNIFICANT CHANGE UP (ref 15–37)
BASOPHILS # BLD AUTO: 0.02 K/UL — SIGNIFICANT CHANGE UP (ref 0–0.2)
BASOPHILS NFR BLD AUTO: 0.4 % — SIGNIFICANT CHANGE UP (ref 0–2)
BILIRUB UR-MCNC: NEGATIVE — SIGNIFICANT CHANGE UP
BUN SERPL-MCNC: 12 MG/DL — SIGNIFICANT CHANGE UP (ref 7–23)
CALCIUM SERPL-MCNC: 8.8 MG/DL — SIGNIFICANT CHANGE UP (ref 8.5–10.1)
CHLORIDE SERPL-SCNC: 109 MMOL/L — HIGH (ref 96–108)
CO2 SERPL-SCNC: 25 MMOL/L — SIGNIFICANT CHANGE UP (ref 22–31)
COLOR SPEC: YELLOW — SIGNIFICANT CHANGE UP
CREAT SERPL-MCNC: 0.77 MG/DL — SIGNIFICANT CHANGE UP (ref 0.5–1.3)
DIFF PNL FLD: NEGATIVE — SIGNIFICANT CHANGE UP
EGFR: 112 ML/MIN/1.73M2 — SIGNIFICANT CHANGE UP
EOSINOPHIL # BLD AUTO: 0.11 K/UL — SIGNIFICANT CHANGE UP (ref 0–0.5)
EOSINOPHIL NFR BLD AUTO: 2.1 % — SIGNIFICANT CHANGE UP (ref 0–6)
GLUCOSE SERPL-MCNC: 88 MG/DL — SIGNIFICANT CHANGE UP (ref 70–99)
GLUCOSE UR QL: NEGATIVE — SIGNIFICANT CHANGE UP
HCT VFR BLD CALC: 33.4 % — LOW (ref 34.5–45)
HGB BLD-MCNC: 10.5 G/DL — LOW (ref 11.5–15.5)
IMM GRANULOCYTES NFR BLD AUTO: 0.2 % — SIGNIFICANT CHANGE UP (ref 0–0.9)
KETONES UR-MCNC: NEGATIVE — SIGNIFICANT CHANGE UP
LEUKOCYTE ESTERASE UR-ACNC: NEGATIVE — SIGNIFICANT CHANGE UP
LYMPHOCYTES # BLD AUTO: 2 K/UL — SIGNIFICANT CHANGE UP (ref 1–3.3)
LYMPHOCYTES # BLD AUTO: 38.4 % — SIGNIFICANT CHANGE UP (ref 13–44)
MAGNESIUM SERPL-MCNC: 2.2 MG/DL — SIGNIFICANT CHANGE UP (ref 1.6–2.6)
MCHC RBC-ENTMCNC: 25 PG — LOW (ref 27–34)
MCHC RBC-ENTMCNC: 31.4 GM/DL — LOW (ref 32–36)
MCV RBC AUTO: 79.5 FL — LOW (ref 80–100)
MONOCYTES # BLD AUTO: 0.48 K/UL — SIGNIFICANT CHANGE UP (ref 0–0.9)
MONOCYTES NFR BLD AUTO: 9.2 % — SIGNIFICANT CHANGE UP (ref 2–14)
NEUTROPHILS # BLD AUTO: 2.59 K/UL — SIGNIFICANT CHANGE UP (ref 1.8–7.4)
NEUTROPHILS NFR BLD AUTO: 49.7 % — SIGNIFICANT CHANGE UP (ref 43–77)
NITRITE UR-MCNC: NEGATIVE — SIGNIFICANT CHANGE UP
NRBC # BLD: 0 /100 WBCS — SIGNIFICANT CHANGE UP (ref 0–0)
PH UR: 7 — SIGNIFICANT CHANGE UP (ref 5–8)
PLATELET # BLD AUTO: 234 K/UL — SIGNIFICANT CHANGE UP (ref 150–400)
POTASSIUM SERPL-MCNC: 4.3 MMOL/L — SIGNIFICANT CHANGE UP (ref 3.5–5.3)
POTASSIUM SERPL-SCNC: 4.3 MMOL/L — SIGNIFICANT CHANGE UP (ref 3.5–5.3)
PROT UR-MCNC: 15
RBC # BLD: 4.2 M/UL — SIGNIFICANT CHANGE UP (ref 3.8–5.2)
RBC # FLD: 14.4 % — SIGNIFICANT CHANGE UP (ref 10.3–14.5)
SODIUM SERPL-SCNC: 139 MMOL/L — SIGNIFICANT CHANGE UP (ref 135–145)
SP GR SPEC: 1.01 — SIGNIFICANT CHANGE UP (ref 1.01–1.02)
UROBILINOGEN FLD QL: NEGATIVE — SIGNIFICANT CHANGE UP
WBC # BLD: 5.21 K/UL — SIGNIFICANT CHANGE UP (ref 3.8–10.5)
WBC # FLD AUTO: 5.21 K/UL — SIGNIFICANT CHANGE UP (ref 3.8–10.5)

## 2023-02-10 PROCEDURE — 71045 X-RAY EXAM CHEST 1 VIEW: CPT | Mod: 26

## 2023-02-10 PROCEDURE — 99284 EMERGENCY DEPT VISIT MOD MDM: CPT

## 2023-02-10 RX ORDER — FAMOTIDINE 10 MG/ML
20 INJECTION INTRAVENOUS ONCE
Refills: 0 | Status: COMPLETED | OUTPATIENT
Start: 2023-02-10 | End: 2023-02-10

## 2023-02-10 RX ORDER — ONDANSETRON 8 MG/1
4 TABLET, FILM COATED ORAL ONCE
Refills: 0 | Status: COMPLETED | OUTPATIENT
Start: 2023-02-10 | End: 2023-02-10

## 2023-02-10 RX ORDER — SODIUM CHLORIDE 9 MG/ML
1000 INJECTION INTRAMUSCULAR; INTRAVENOUS; SUBCUTANEOUS ONCE
Refills: 0 | Status: COMPLETED | OUTPATIENT
Start: 2023-02-10 | End: 2023-02-10

## 2023-02-10 RX ADMIN — ONDANSETRON 4 MILLIGRAM(S): 8 TABLET, FILM COATED ORAL at 23:11

## 2023-02-10 RX ADMIN — FAMOTIDINE 20 MILLIGRAM(S): 10 INJECTION INTRAVENOUS at 23:08

## 2023-02-10 RX ADMIN — SODIUM CHLORIDE 2000 MILLILITER(S): 9 INJECTION INTRAMUSCULAR; INTRAVENOUS; SUBCUTANEOUS at 23:08

## 2023-02-10 NOTE — ED PROVIDER NOTE - NS ED ATTENDING STATEMENT MOD
This was a shared visit with the CHUYITA. I reviewed and verified the documentation and independently performed the documented:

## 2023-02-10 NOTE — ED PROVIDER NOTE - CONSTITUTIONAL, MLM
well appearing female, oriented to person, place, time/situation and in no apparent distress. normal...

## 2023-02-10 NOTE — ED ADULT TRIAGE NOTE - CHIEF COMPLAINT QUOTE
Pt c/o feeling feerish, mid back pain and headaches x 1 week, was seen in urgent care, had blood work done, was tested for flu and covid, given zpack for sinus infection, both negative but sts still not getting better.

## 2023-02-10 NOTE — ED PROVIDER NOTE - CLINICAL SUMMARY MEDICAL DECISION MAKING FREE TEXT BOX
22 y/o female H/O celiac disease C/C Patient is feeling general  weakness, abdominal pain back pain and diarrhea. Exam, mild abdominal discomfort, no focal pain, no guarding, no rebound no CVAT, labs result and  CT  normal, stable at discharge and O/P GI referral given

## 2023-02-10 NOTE — ED PROVIDER NOTE - CARE PROVIDER_API CALL
Speedy Campbell ()  Internal Medicine  [address] [place] [address]  Phone: [phone]  Fax: [phone]  Follow Up Time: 1-3 Days

## 2023-02-10 NOTE — ED PROVIDER NOTE - PATIENT PORTAL LINK FT
You can access the FollowMyHealth Patient Portal offered by [facility] by registering at the following website: [email or web address]. By joining [employer]’s FollowMyHealth portal, you will also be able to view your health information using other applications (apps) compatible with our system.

## 2023-02-10 NOTE — ED PROVIDER NOTE - PROGRESS NOTE DETAILS
Shared decision making used in regards to CT abd/pelvis  with patient and mother. They would like to get the CT to r/o intrabadominal pathology for abdominal pain, flank pain and fever. Aware of risk of radiation.

## 2023-02-10 NOTE — ED ADULT NURSE NOTE - OBJECTIVE STATEMENT
23yr old female a&ox4 sitting up in stretcher notes to be having fever and chills with back pain and discomfort x1 week. Pt notes to have been seen at urgent care, and treated to for sinus infection pt notes to not be feeling better still, pt notes nausea as well with low PO intake. Pt denies active sob or chest pain.

## 2023-02-10 NOTE — ED PROVIDER NOTE - OBJECTIVE STATEMENT
Pt c/o feeling feerish, mid back pain and headaches x 1 week, was seen in urgent care, had blood work done, was tested for flu and covid, given zpack for sinus infection, both negative but sts still not getting better.  back pain general, headache 23-year-old female with history of celiac disease presents to the ED with her mother for not feeling well for the past week.  Patient complains of fever, chills, headache, back pain, abdominal pain, diarrhea.  Patient went to her primary care doctor twice and urgent care.  Patient had COVID/flu swab, UA, strep swab and labs which were all unremarkable as per patient.  Patient states she still not feeling well.  Patient is feeling very weak and today having worsening abdominal pain back pain and diarrhea.  Patient states that she had cough and congestion earlier in the week but that has improved.  Patient was prescribed a Z-Sebastian by her primary care doctor for possible sinus infection.  Denies chest pain, shortness of breath, vomiting, urinary symptoms, hematuria.

## 2023-02-11 VITALS
SYSTOLIC BLOOD PRESSURE: 128 MMHG | TEMPERATURE: 98 F | RESPIRATION RATE: 18 BRPM | DIASTOLIC BLOOD PRESSURE: 84 MMHG | HEART RATE: 80 BPM | OXYGEN SATURATION: 98 %

## 2023-02-11 LAB
ALP SERPL-CCNC: 48 U/L — SIGNIFICANT CHANGE UP (ref 40–120)
BILIRUB SERPL-MCNC: 0.3 MG/DL — SIGNIFICANT CHANGE UP (ref 0.2–1.2)
HCG SERPL-ACNC: <1 MIU/ML — SIGNIFICANT CHANGE UP
PROT SERPL-MCNC: 7.4 G/DL — SIGNIFICANT CHANGE UP (ref 6–8.3)
RAPID RVP RESULT: SIGNIFICANT CHANGE UP
SARS-COV-2 RNA SPEC QL NAA+PROBE: SIGNIFICANT CHANGE UP

## 2023-02-11 PROCEDURE — 81001 URINALYSIS AUTO W/SCOPE: CPT

## 2023-02-11 PROCEDURE — 84702 CHORIONIC GONADOTROPIN TEST: CPT

## 2023-02-11 PROCEDURE — 99284 EMERGENCY DEPT VISIT MOD MDM: CPT | Mod: 25

## 2023-02-11 PROCEDURE — 83735 ASSAY OF MAGNESIUM: CPT

## 2023-02-11 PROCEDURE — 71045 X-RAY EXAM CHEST 1 VIEW: CPT

## 2023-02-11 PROCEDURE — 96375 TX/PRO/DX INJ NEW DRUG ADDON: CPT

## 2023-02-11 PROCEDURE — 74177 CT ABD & PELVIS W/CONTRAST: CPT | Mod: ME

## 2023-02-11 PROCEDURE — 36415 COLL VENOUS BLD VENIPUNCTURE: CPT

## 2023-02-11 PROCEDURE — 87086 URINE CULTURE/COLONY COUNT: CPT

## 2023-02-11 PROCEDURE — 80053 COMPREHEN METABOLIC PANEL: CPT

## 2023-02-11 PROCEDURE — 74177 CT ABD & PELVIS W/CONTRAST: CPT | Mod: 26,ME

## 2023-02-11 PROCEDURE — 96374 THER/PROPH/DIAG INJ IV PUSH: CPT | Mod: XU

## 2023-02-11 PROCEDURE — G1004: CPT

## 2023-02-11 PROCEDURE — 85025 COMPLETE CBC W/AUTO DIFF WBC: CPT

## 2023-02-11 PROCEDURE — 0225U NFCT DS DNA&RNA 21 SARSCOV2: CPT

## 2023-02-11 RX ORDER — SODIUM CHLORIDE 9 MG/ML
1000 INJECTION INTRAMUSCULAR; INTRAVENOUS; SUBCUTANEOUS ONCE
Refills: 0 | Status: DISCONTINUED | OUTPATIENT
Start: 2023-02-11 | End: 2023-02-14

## 2023-02-11 NOTE — ED ADULT NURSE REASSESSMENT NOTE - NS ED NURSE REASSESS COMMENT FT1
Assumed care of Pt from previous RN, Pt awaiting CT scan results, currently resting comfortably on stretcher, IVF infusing, mother at bedside, will continue to monitor closely.

## 2023-02-11 NOTE — ED ADULT NURSE REASSESSMENT NOTE - NSIMPLEMENTINTERV_GEN_ALL_ED
Implemented All Universal Safety Interventions:  [place] to call system. Call bell, personal items and telephone within reach. Instruct patient to call for assistance. Room bathroom lighting operational. Non-slip footwear when patient is off stretcher. Physically safe environment: no spills, clutter or unnecessary equipment. Stretcher in lowest position, wheels locked, appropriate side rails in place.

## 2023-02-12 LAB
CULTURE RESULTS: SIGNIFICANT CHANGE UP
SPECIMEN SOURCE: SIGNIFICANT CHANGE UP

## 2023-03-06 RX ORDER — GABAPENTIN 300 MG/1
300 CAPSULE ORAL
Qty: 7 | Refills: 0 | Status: ACTIVE | COMMUNITY
Start: 2023-03-06 | End: 1900-01-01

## 2023-03-06 RX ORDER — CELECOXIB 200 MG/1
200 CAPSULE ORAL
Qty: 7 | Refills: 0 | Status: ACTIVE | COMMUNITY
Start: 2023-03-06 | End: 1900-01-01

## 2023-03-10 NOTE — ASU PATIENT PROFILE, ADULT - NSICDXPASTMEDICALHX_GEN_ALL_CORE_FT
PAST MEDICAL HISTORY:  2019 novel coronavirus disease (COVID-19) june 2022/asymptomatic    2019 novel coronavirus disease (COVID-19) 10/2022    Celiac disease     Female gynecomastia

## 2023-03-10 NOTE — ASU PATIENT PROFILE, ADULT - NSICDXPASTSURGICALHX_GEN_ALL_CORE_FT
PAST SURGICAL HISTORY:  History of surgery REMOVAL OF FOREIGN BODY (GLASS) LEFT FOOT     PAST SURGICAL HISTORY:  History of surgery REMOVAL OF FOREIGN BODY (GLASS) LEFT FOOT    S/P bilateral breast reduction

## 2023-03-13 ENCOUNTER — TRANSCRIPTION ENCOUNTER (OUTPATIENT)
Age: 23
End: 2023-03-13

## 2023-03-13 ENCOUNTER — APPOINTMENT (OUTPATIENT)
Dept: PLASTIC SURGERY | Facility: AMBULATORY SURGERY CENTER | Age: 23
End: 2023-03-13
Payer: COMMERCIAL

## 2023-03-13 ENCOUNTER — OUTPATIENT (OUTPATIENT)
Dept: INPATIENT UNIT | Facility: HOSPITAL | Age: 23
LOS: 1 days | Discharge: HOME | End: 2023-03-13
Payer: COMMERCIAL

## 2023-03-13 ENCOUNTER — RESULT REVIEW (OUTPATIENT)
Age: 23
End: 2023-03-13

## 2023-03-13 VITALS
RESPIRATION RATE: 18 BRPM | TEMPERATURE: 98 F | SYSTOLIC BLOOD PRESSURE: 113 MMHG | OXYGEN SATURATION: 98 % | HEIGHT: 68 IN | HEART RATE: 80 BPM | WEIGHT: 130.07 LBS | DIASTOLIC BLOOD PRESSURE: 58 MMHG

## 2023-03-13 VITALS
RESPIRATION RATE: 18 BRPM | SYSTOLIC BLOOD PRESSURE: 110 MMHG | HEART RATE: 76 BPM | DIASTOLIC BLOOD PRESSURE: 63 MMHG | OXYGEN SATURATION: 98 %

## 2023-03-13 DIAGNOSIS — Z98.890 OTHER SPECIFIED POSTPROCEDURAL STATES: Chronic | ICD-10-CM

## 2023-03-13 DIAGNOSIS — L91.0 HYPERTROPHIC SCAR: ICD-10-CM

## 2023-03-13 PROCEDURE — 88305 TISSUE EXAM BY PATHOLOGIST: CPT

## 2023-03-13 PROCEDURE — 12031 INTMD RPR S/A/T/EXT 2.5 CM/<: CPT

## 2023-03-13 PROCEDURE — 88305 TISSUE EXAM BY PATHOLOGIST: CPT | Mod: 26

## 2023-03-13 PROCEDURE — 11402 EXC TR-EXT B9+MARG 1.1-2 CM: CPT

## 2023-03-13 RX ORDER — OXYCODONE HYDROCHLORIDE 5 MG/1
5 TABLET ORAL ONCE
Refills: 0 | Status: DISCONTINUED | OUTPATIENT
Start: 2023-03-13 | End: 2023-03-13

## 2023-03-13 RX ORDER — HYDROMORPHONE HYDROCHLORIDE 2 MG/ML
0.5 INJECTION INTRAMUSCULAR; INTRAVENOUS; SUBCUTANEOUS
Refills: 0 | Status: DISCONTINUED | OUTPATIENT
Start: 2023-03-13 | End: 2023-03-13

## 2023-03-13 RX ORDER — SODIUM CHLORIDE 9 MG/ML
1000 INJECTION, SOLUTION INTRAVENOUS
Refills: 0 | Status: DISCONTINUED | OUTPATIENT
Start: 2023-03-13 | End: 2023-03-13

## 2023-03-13 RX ORDER — NORETHINDRONE AND ETHINYL ESTRADIOL 0.4-0.035
1 KIT ORAL
Qty: 0 | Refills: 0 | DISCHARGE

## 2023-03-13 RX ORDER — ONDANSETRON 8 MG/1
4 TABLET, FILM COATED ORAL ONCE
Refills: 0 | Status: COMPLETED | OUTPATIENT
Start: 2023-03-13 | End: 2023-03-13

## 2023-03-13 RX ORDER — SERTRALINE 25 MG/1
1 TABLET, FILM COATED ORAL
Qty: 0 | Refills: 0 | DISCHARGE

## 2023-03-13 RX ORDER — TRAMADOL HYDROCHLORIDE 50 MG/1
1 TABLET ORAL
Qty: 10 | Refills: 0
Start: 2023-03-13 | End: 2023-03-15

## 2023-03-13 RX ORDER — SCOPALAMINE 1 MG/3D
1 PATCH, EXTENDED RELEASE TRANSDERMAL
Refills: 0 | Status: DISCONTINUED | OUTPATIENT
Start: 2023-03-13 | End: 2023-03-13

## 2023-03-13 RX ADMIN — ONDANSETRON 4 MILLIGRAM(S): 8 TABLET, FILM COATED ORAL at 18:14

## 2023-03-13 RX ADMIN — SODIUM CHLORIDE 100 MILLILITER(S): 9 INJECTION, SOLUTION INTRAVENOUS at 17:28

## 2023-03-13 RX ADMIN — HYDROMORPHONE HYDROCHLORIDE 0.5 MILLIGRAM(S): 2 INJECTION INTRAMUSCULAR; INTRAVENOUS; SUBCUTANEOUS at 18:06

## 2023-03-13 RX ADMIN — SCOPALAMINE 1 PATCH: 1 PATCH, EXTENDED RELEASE TRANSDERMAL at 13:02

## 2023-03-13 NOTE — CHART NOTE - NSCHARTNOTEFT_GEN_A_CORE
PACU ANESTHESIA ADMISSION NOTE      Procedure: Breast revision surgery  excision of bilateral breast scar      Post op diagnosis:  Scar of breast        __x__  Patent Airway    ___x_  Full return of protective reflexes    ___x_  Full recovery from anesthesia / back to baseline     Vitals:   T:    97.6       R:   11               BP:  126/78                Sat: 100%                  P: 100      Mental Status:  ___x_ Awake   _____ Alert   _____ Drowsy   _____ Sedated    Nausea/Vomiting:  ____ NO  ______Yes,   See Post - Op Orders          Pain Scale (0-10):  _____    Treatment: ____ None    ___x_ See Post - Op/PCA Orders    Post - Operative Fluids:   ____ Oral   __x__ See Post - Op Orders    Plan: Discharge:   __x__Home       _____Floor     _____Critical Care    _____  Other:_________________    Comments: Pt tolerated procedure well, no anesthesia related complications. Care of pt endorsed to PACU, report given to PACU RN. Discharge when criteria are met.

## 2023-03-13 NOTE — ASU DISCHARGE PLAN (ADULT/PEDIATRIC) - PAIN MANAGEMENT
Doxycycline, Tramadol for severe pain/Take over the counter pain medication/Prescriptions [facility]

## 2023-03-13 NOTE — ASU DISCHARGE PLAN (ADULT/PEDIATRIC) - CARE PROVIDER_API CALL
Deshawn Morton)  Plastic Surgery; Surgery of the Hand  [address] [place] [address], [address]  [place] [address]  Phone: [phone]  Fax: [phone]  Follow Up Time:

## 2023-03-13 NOTE — BRIEF OPERATIVE NOTE - NSICDXBRIEFPROCEDURE_GEN_ALL_CORE_FT
PROCEDURES:  Breast revision surgery 13-Mar-2023 17:24:44 excision of bilateral breast scar Saranya Neff

## 2023-03-13 NOTE — ASU DISCHARGE PLAN (ADULT/PEDIATRIC) - ASU DC SPECIAL INSTRUCTIONSFT
Keep surgical site clean and dry.   Do not remove any dressings or get them wet.   Sleep on your back with head elevated to reduce swelling.   Wear surgical bra at all times and do not remove it.   Rest, no lifting.

## 2023-03-14 ENCOUNTER — NON-APPOINTMENT (OUTPATIENT)
Age: 23
End: 2023-03-14

## 2023-03-16 LAB — SURGICAL PATHOLOGY STUDY: SIGNIFICANT CHANGE UP

## 2023-03-17 DIAGNOSIS — N65.1 DISPROPORTION OF RECONSTRUCTED BREAST: ICD-10-CM

## 2023-03-17 DIAGNOSIS — L90.5 SCAR CONDITIONS AND FIBROSIS OF SKIN: ICD-10-CM

## 2023-03-17 DIAGNOSIS — Z86.16 PERSONAL HISTORY OF COVID-19: ICD-10-CM

## 2023-03-20 ENCOUNTER — APPOINTMENT (OUTPATIENT)
Dept: PLASTIC SURGERY | Facility: CLINIC | Age: 23
End: 2023-03-20
Payer: COMMERCIAL

## 2023-03-20 DIAGNOSIS — N62 HYPERTROPHY OF BREAST: ICD-10-CM

## 2023-03-20 PROBLEM — U07.1 COVID-19: Chronic | Status: ACTIVE | Noted: 2023-03-10

## 2023-03-20 PROCEDURE — 99024 POSTOP FOLLOW-UP VISIT: CPT

## 2023-03-20 NOTE — PHYSICAL EXAM
[de-identified] : well developed female, NAD [de-identified] : unlabored breathing  [de-identified] : RENR [de-identified] : Bilateral breasts soft, incisions healing well, c/d/i,  no erythema or fluid collection, improved size symmetry, left inferior breast contour irregularity

## 2023-03-20 NOTE — ASSESSMENT
[FreeTextEntry1] : 22 yo healthy F with symptomatic macromastia s/p BBR. Doing well. \par Breast asymmetry and painful scars. \par \par Now POD#7 s/p revision of BBR and excision of painful scars. \par \par - dressings changed\par - may shower\par - daily Aquaphor and Scarguard in 1 week \par - post-op instructions reviewed and all questions were answered\par - f/u 2 weeks with Dr. Morton. \par \par Due to COVID 19, pre-visit patient instructions were explained to the patient and their symptoms were checked upon arrival.  Masks were used by the health care providers and staff and the examination room was cleaned after the patient visit was completed.\par \par \par \par \par \par \par \par \par

## 2023-03-20 NOTE — DATA REVIEWED
[FreeTextEntry1] : Pathology             Final\par \par No Documents Attached\par \par \par \par   [facility] Accession Number : [id number]\par Patient:   MEHRAN MAYES\par \par \par Accession:                             [id number]\par \par Collected Date/Time:                   3/13/2023 16:40 EDT\par Received Date/Time:                    3/14/2023 08:31 EDT\par \par Surgical Pathology Report - Auth (Verified)\par \par Specimen(s) Submitted\par 1  Right breast scar tissue\par Time obtained: 4:40 pm\par Cold ischemic time <1 hour\par 2  Left breast scar tissue\par Time obtained: 4:45 pm\par Cold ischemic time <1 hour\par \par Final Diagnosis\par \par 1  Right breast scar tissue, excision:\par -Skin showing dermal fibrosis, and focal scar with focal foreign body type\par multinucleate giant cell reaction.\par -Benign fatty breast tissue showing non-proliferative fibrocystic change\par \par 2  Left breast scar tissue, excision:\par -Skin showing dermal fibrosis and focal scar.\par -Benign fatty breast tissue showing non-proliferative fibrocystic change\par Verified by: Patrica Ramachandran M.D.\par (Electronic Signature)\par Reported on: 03/16/23 16:11 EDT, [facility],\par [facility] [place] [address]\par Phone: [phone]   Fax: [phone]\par _________________________________________________________________\par \par \par Clinical Information\par Excision bilateral breast scar tissue\par prior breast reduction\par \par Perioperative Diagnosis\par S/P bilateral breast reduction\par \par Gross Description\par 1.  The specimen is received fresh labeled "right breast the scar tissue".\par It consists of multiple fragments of tan-white skin and subcutaneous\par tissue measuring 10 x 6 x 1.5 cm in aggregate.  There are 2 scars\par identified measuring 1.5 x 0.3 cm., 1 x 0.2 cm. Representative sections\par are submitted.\par (2 blocks)\par \par 2.  The specimen is received fresh labeled "left breast the scar tissue".\par It consists of multiple fragments of tan-white skin and underlying\par soft tissue measuring 7 x 4 x 2 cm in aggregate.  There are multiple\par \par scars identified measuring 4.5 x 0.3 cm, 1.3 x 0.3 cm, 0.7 x 0.3 cm.\par Representative sections are submitted.  (2 blocks)\par \par Specimen was received and underwent gross examination at [facility], [address] [place] [address].\par \par 03/14/2023 19:56:07 EDT   \par \par  \par \par  Ordered by: ELBA BALDERAS IV       Collected/Examined: 13Mar2023 04:40PM       \par Verification Required       Stage: Final       \par  Performed at: [facility]       Resulted: 16Mar2023 04:11PM       Last Updated: 16Mar2023 04:11PM       Accession: [id number]

## 2023-03-20 NOTE — HISTORY OF PRESENT ILLNESS
[FreeTextEntry1] : 21 yo otherwise healthy F with no significant PMHx who presents today for breast reduction consultation. Patient wears a 34 DD- DDD and c/o dense heavy chest causing neck, shoulder and back pain, shoulder grooving, poor posture and frequent skin rash in the IMF treated multiple times in the past with OTC medications. Patient has seen a chiropractor and a PT for back pain for 2 years with minimal improvement and has been told to consider breast reduction to alleviate symptoms. Also c/o difficulty with working out secondary to heavy chest.\par Denies any personal or family h/o breast disease. No prior mammograms. Denies any breast nodules, nipple discharge or inversion. \par \par Desires a full B- small C\par \par Student - elementary education \par Nonsmoker \par \par Interval hx (7/5/22). Patient presents today POD#8 s/p BBR. Doing well with normal incisional discomfort. Completed all prescribed medications. Denies any f/c or bleeding.   \par \par Interval hx (7/12/22): Pt 2 weeks s/p BBR. Pt here with mother. Both visibly upset & tearful c/o NAC asymmetry, L higher than R & pt larger than she expected. Pt also c/o TTP on left lateral chest. Denies f/c, bleeding. 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 \par \par Interval hx (7/28/22): Pt 1 month s/p BBR, here for clearance to return to full activity as she starts dance school next week. \par \par Interval hx (10/6/22). Pt here 3.5 months s/p BBR. Doing well but concerned with breast and NAC asymmetry R>L. Pt prefers the size and shape of left breast but does not like the superior nipple position. \par \par Interval hx (3/20/23). Pt here POD#7 s/p revision of BBR and excision of painful scars. Doing well with no significant pain, f/c or drainage. Completed all prescribed medications.

## 2023-04-04 ENCOUNTER — APPOINTMENT (OUTPATIENT)
Dept: PLASTIC SURGERY | Facility: CLINIC | Age: 23
End: 2023-04-04
Payer: COMMERCIAL

## 2023-04-04 PROCEDURE — 99212 OFFICE O/P EST SF 10 MIN: CPT

## 2023-04-04 NOTE — HISTORY OF PRESENT ILLNESS
[FreeTextEntry1] : 23 yo otherwise healthy F with no significant PMHx who presents today for breast reduction consultation. Patient wears a 34 DD- DDD and c/o dense heavy chest causing neck, shoulder and back pain, shoulder grooving, poor posture and frequent skin rash in the IMF treated multiple times in the past with OTC medications. Patient has seen a chiropractor and a PT for back pain for 2 years with minimal improvement and has been told to consider breast reduction to alleviate symptoms. Also c/o difficulty with working out secondary to heavy chest.\par Denies any personal or family h/o breast disease. No prior mammograms. Denies any breast nodules, nipple discharge or inversion. \par \par Desires a full B- small C\par \par Student - elementary education \par Nonsmoker \par \par Interval hx (7/5/22). Patient presents today POD#8 s/p BBR. Doing well with normal incisional discomfort. Completed all prescribed medications. Denies any f/c or bleeding.   \par \par Interval hx (7/12/22): Pt 2 weeks s/p BBR. Pt here with mother. Both visibly upset & tearful c/o NAC asymmetry, L higher than R & pt larger than she expected. Pt also c/o TTP on left lateral chest. Denies f/c, bleeding. 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 \par \par Interval hx (7/28/22): Pt 1 month s/p BBR, here for clearance to return to full activity as she starts dance school next week. \par \par Interval hx (10/6/22). Pt here 3.5 months s/p BBR. Doing well but concerned with breast and NAC asymmetry R>L. Pt prefers the size and shape of left breast but does not like the superior nipple position. \par \par Interval hx (3/20/23). Pt here POD#7 s/p revision of BBR and excision of painful scars. Doing well with no significant pain, f/c or drainage. Completed all prescribed medications.

## 2023-04-04 NOTE — ASSESSMENT
[FreeTextEntry1] : 24 yo healthy F with symptomatic macromastia s/p BBR. Doing well. \par Breast asymmetry and painful scars. \par \par Now POD#7 s/p revision of BBR and excision of painful scars. \par \par - dressings changed\par - may shower\par - daily Aquaphor and Scarguard in 1 week \par - post-op instructions reviewed and all questions were answered\par - f/u 2 weeks with Dr. Morton. \par \par Due to COVID 19, pre-visit patient instructions were explained to the patient and their symptoms were checked upon arrival.  Masks were used by the health care providers and staff and the examination room was cleaned after the patient visit was completed.\par \par 4/4/2023\par seen w father and Trudi\par doing well overall\par slight resolving bruising left inferior breast\par normal postop swelling\par right side not as swollen, as expected\par \par stop aquafor\par silicon patch\par resume low level dance in 2 weeks, gradually increasing to normal 10-14 days thereafter\par f/u 2 months\par \par pt happy overall\par took "few pain pills" first two days after surgery, none since\par \par \par \par \par \par \par \par \par \par \par

## 2023-04-04 NOTE — PHYSICAL EXAM
[de-identified] : well developed female, NAD [de-identified] : unlabored breathing  [de-identified] : RENR [de-identified] : Bilateral breasts soft, incisions healing well, c/d/i,  no erythema or fluid collection, improved size symmetry, left inferior breast contour irregularity

## 2023-04-04 NOTE — DATA REVIEWED
[FreeTextEntry1] : Pathology             Final\par \par No Documents Attached\par \par \par \par   [facility] Accession Number : [id number]\par Patient:   MEHRAN MAYES\par \par \par Accession:                             [id number]\par \par Collected Date/Time:                   3/13/2023 16:40 EDT\par Received Date/Time:                    3/14/2023 08:31 EDT\par \par Surgical Pathology Report - Auth (Verified)\par \par Specimen(s) Submitted\par 1  Right breast scar tissue\par Time obtained: 4:40 pm\par Cold ischemic time <1 hour\par 2  Left breast scar tissue\par Time obtained: 4:45 pm\par Cold ischemic time <1 hour\par \par Final Diagnosis\par \par 1  Right breast scar tissue, excision:\par -Skin showing dermal fibrosis, and focal scar with focal foreign body type\par multinucleate giant cell reaction.\par -Benign fatty breast tissue showing non-proliferative fibrocystic change\par \par 2  Left breast scar tissue, excision:\par -Skin showing dermal fibrosis and focal scar.\par -Benign fatty breast tissue showing non-proliferative fibrocystic change\par Verified by: Patrica Ramachandran M.D.\par (Electronic Signature)\par Reported on: 03/16/23 16:11 EDT, [facility],\par [facility] [place] [address]\par Phone: [phone]   Fax: [phone]\par _________________________________________________________________\par \par \par Clinical Information\par Excision bilateral breast scar tissue\par prior breast reduction\par \par Perioperative Diagnosis\par S/P bilateral breast reduction\par \par Gross Description\par 1.  The specimen is received fresh labeled "right breast the scar tissue".\par It consists of multiple fragments of tan-white skin and subcutaneous\par tissue measuring 10 x 6 x 1.5 cm in aggregate.  There are 2 scars\par identified measuring 1.5 x 0.3 cm., 1 x 0.2 cm. Representative sections\par are submitted.\par (2 blocks)\par \par 2.  The specimen is received fresh labeled "left breast the scar tissue".\par It consists of multiple fragments of tan-white skin and underlying\par soft tissue measuring 7 x 4 x 2 cm in aggregate.  There are multiple\par \par scars identified measuring 4.5 x 0.3 cm, 1.3 x 0.3 cm, 0.7 x 0.3 cm.\par Representative sections are submitted.  (2 blocks)\par \par Specimen was received and underwent gross examination at [facility], [address] [place] [address].\par \par 03/14/2023 19:56:07 EDT   \par \par  \par \par  Ordered by: ELBA BALDERAS IV       Collected/Examined: 13Mar2023 04:40PM       \par Verification Required       Stage: Final       \par  Performed at: [facility]       Resulted: 16Mar2023 04:11PM       Last Updated: 16Mar2023 04:11PM       Accession: [id number]

## 2023-05-15 NOTE — ED ADULT TRIAGE NOTE - BP NONINVASIVE SYSTOLIC (MM HG)
Internal Medicine Progress Note  Patient: Alis Amado  Age/sex: 73 y o  male  Medical Record #: [id number]      ASSESSMENT/PLAN: (Interval History)  Alis Amado is seen and examined and management for following issues:    S/p posterior C4-T1 decompressive laminectomy and instrumented fusion  • No collar needed  • Incision healing well     HTN  • Home: Lotrel 5-20 daily  • Here: Amlopdipine 5mg dailyLlisinopril 20mg daily  • No changes today     DM type 2   • HA1C 6   • Home: Amaryl 2mg daily/Pioglitazone-Metformin  2x daily  • Here: Metformin 500 mg BID  • Continue QID Accuchecks/SSI and DM diet  • No changes today     Obesity  • BMI 39 5  • Recommended ongoing attempts at weight loss  Discharge date:  Team      The above assessment and plan was reviewed and updated as determined by my evaluation of the patient on 5/15/2023      Labs:   Results from last 7 days   Lab Units 05/12/23  1102 05/11/23  0920   WBC Thousand/uL 13 09* 18 52*   HEMOGLOBIN g/dL 13 7 14 1   HEMATOCRIT % 40 8 43 1   PLATELETS Thousands/uL 286 293     Results from last 7 days   Lab Units 05/11/23  0557 05/10/23  1000   SODIUM mmol/L 135 138   POTASSIUM mmol/L 4 1 4 1   CHLORIDE mmol/L 104 107   CO2 mmol/L 25 26   BUN mg/dL 23 26*   CREATININE mg/dL 1 07 1 03   CALCIUM mg/dL 10 1 10 4*         Results from last 7 days   Lab Units 05/11/23  0759 05/10/23  1000   INR  1 11 0 99     Results from last 7 days   Lab Units 05/15/23  1046 05/15/23  0621 05/14/23  2132   POC GLUCOSE mg/dl 193* 150* 166*       Review of Scheduled Meds:  Current Facility-Administered Medications   Medication Dose Route Frequency Provider Last Rate   • acetaminophen  975 mg Oral [facility] Traci Mancia MD     • amLODIPine  5 mg Oral Daily Traci Mancia MD      And   • lisinopril  20 mg Oral Daily Traci Mancia MD     • bisacodyl  10 mg Rectal Daily PRN Traci Mancia MD     • calcium carbonate  750 mg Oral TID PRN Traci Mancia MD     • docusate sodium  100 mg Oral BID Nohelia Martins MD     • heparin (porcine)  5,000 Units Subcutaneous [facility] Nohelia Martisn MD     • insulin lispro  1-5 Units Subcutaneous HS Nohelia Martins MD     • insulin lispro  1-5 Units Subcutaneous 4x Daily (AC & HS) Areli Woods PA-C     • metFORMIN  500 mg Oral BID With Meals Areli Woods PA-C     • methocarbamol  250 mg Oral Q6H [address] Nohelia Martins MD     • naloxone  0 04 mg Intravenous Q1MIN PRN Nohelia Martins MD     • ondansetron  4 mg Oral Q6H PRN Nohelia Martins MD     • oxyCODONE  2 5 mg Oral Q4H PRN Nohelia Martins MD      Or   • oxyCODONE  5 mg Oral Q4H PRN Nohelia Martins MD     • polyethylene glycol  17 g Oral Daily Nohelia Martins MD     • senna  2 tablet Oral HS Nohelia Martins MD         Subjective/ HPI: Patient seen and examined  Patients overnight issues or events were reviewed with nursing or staff during rounds or morning huddle session  New or overnight issues include the following:     No new or overnight issues  Offers no complaints    ROS:   A 10 point ROS was performed; negative except as noted above         Imaging:     No orders to display       *Labs /Radiology studies reviewed  *Medications reviewed and reconciled as needed  *Please refer to order section for additional ordered labs studies  *Case discussed with primary attending during morning huddle case rounds    Physical Examination:  Vitals:   Vitals:    05/15/23 0618 05/15/23 0845 05/15/23 0900 05/15/23 0901   BP: 138/72 114/61 131/73 135/80   BP Location: Left leg  Left arm Left arm   Pulse: 87      Resp: 18      Temp: 98 6 °F (37 °C)      TempSrc: Oral      SpO2:       Weight:       Height:           General Appearance: no distress, conversive  HEENT: PERRLA, conjuctiva normal; oropharynx clear; mucous membranes moist   Neck:  Incision healing  Lungs: CTA, normal respiratory effort, no retractions, expiratory effort normal  CV: regular rate and rhythm; no rubs/murmurs/gallops, PMI normal   ABD: soft; ND/NT; +BS  EXT: no edema  Skin: normal turgor, normal texture, no rashes  Psych: affect normal, mood normal  Neuro: AAO; right hand weakness       The above physical exam was reviewed and updated as determined by my evaluation of the patient on 5/15/2023  Invasive Devices     Drain  Duration           Open Drain Left Groin 606 days                   VTE Pharmacologic Prophylaxis: Heparin  Code Status: Level 1 - Full Code  Current Length of Stay: 1 day(s)      Total time spent:  30 minutes with more than 50% spent counseling/coordinating care  Counseling includes discussion with patient re: progress  and discussion with patient of his/her current medical state/information  Coordination of patient's care was performed in conjunction with primary service  Time invested included review of patient's labs, vitals, and management of their comorbidities with continued monitoring  In addition, this patient was discussed with medical team including physician and advanced extenders  The care of the patient was extensively discussed and appropriate treatment plan was formulated unique for this patient  Medical decision making for the day was made by supervising physician unless otherwise noted in their attestation statement  ** Please Note:  voice to text software may have been used in the creation of this document   Although proof errors in transcription or interpretation are a potential of such software** 134

## 2023-05-30 ENCOUNTER — APPOINTMENT (OUTPATIENT)
Dept: PLASTIC SURGERY | Facility: CLINIC | Age: 23
End: 2023-05-30
Payer: COMMERCIAL

## 2023-05-30 PROCEDURE — 99024 POSTOP FOLLOW-UP VISIT: CPT

## 2023-05-30 NOTE — PHYSICAL EXAM
[de-identified] : well developed female, NAD [de-identified] : unlabored breathing  [de-identified] : RENR [de-identified] : Bilateral breasts soft, incisions healing well, c/d/i,  no erythema or fluid collection, improved size symmetry, left inferior breast contour irregularity

## 2023-05-30 NOTE — DATA REVIEWED
[FreeTextEntry1] : Pathology             Final\par \par No Documents Attached\par \par \par \par   [facility] Accession Number : [id number]\par Patient:   MEHRAN MAYES\par \par \par Accession:                             [id number]\par \par Collected Date/Time:                   3/13/2023 16:40 EDT\par Received Date/Time:                    3/14/2023 08:31 EDT\par \par Surgical Pathology Report - Auth (Verified)\par \par Specimen(s) Submitted\par 1  Right breast scar tissue\par Time obtained: 4:40 pm\par Cold ischemic time <1 hour\par 2  Left breast scar tissue\par Time obtained: 4:45 pm\par Cold ischemic time <1 hour\par \par Final Diagnosis\par \par 1  Right breast scar tissue, excision:\par -Skin showing dermal fibrosis, and focal scar with focal foreign body type\par multinucleate giant cell reaction.\par -Benign fatty breast tissue showing non-proliferative fibrocystic change\par \par 2  Left breast scar tissue, excision:\par -Skin showing dermal fibrosis and focal scar.\par -Benign fatty breast tissue showing non-proliferative fibrocystic change\par Verified by: Patrica Ramachandran M.D.\par (Electronic Signature)\par Reported on: 03/16/23 16:11 EDT, [facility],\par [facility] [place] [address]\par Phone: [phone]   Fax: [phone]\par _________________________________________________________________\par \par \par Clinical Information\par Excision bilateral breast scar tissue\par prior breast reduction\par \par Perioperative Diagnosis\par S/P bilateral breast reduction\par \par Gross Description\par 1.  The specimen is received fresh labeled "right breast the scar tissue".\par It consists of multiple fragments of tan-white skin and subcutaneous\par tissue measuring 10 x 6 x 1.5 cm in aggregate.  There are 2 scars\par identified measuring 1.5 x 0.3 cm., 1 x 0.2 cm. Representative sections\par are submitted.\par (2 blocks)\par \par 2.  The specimen is received fresh labeled "left breast the scar tissue".\par It consists of multiple fragments of tan-white skin and underlying\par soft tissue measuring 7 x 4 x 2 cm in aggregate.  There are multiple\par \par scars identified measuring 4.5 x 0.3 cm, 1.3 x 0.3 cm, 0.7 x 0.3 cm.\par Representative sections are submitted.  (2 blocks)\par \par Specimen was received and underwent gross examination at [facility], [address] [place] [address].\par \par 03/14/2023 19:56:07 EDT   \par \par  \par \par  Ordered by: ELBA BALDERAS IV       Collected/Examined: 13Mar2023 04:40PM       \par Verification Required       Stage: Final       \par  Performed at: [facility]       Resulted: 16Mar2023 04:11PM       Last Updated: 16Mar2023 04:11PM       Accession: [id number]

## 2023-05-30 NOTE — ASSESSMENT
[FreeTextEntry1] : 24 yo healthy F with symptomatic macromastia s/p BBR. Doing well. \par Breast asymmetry and painful scars. \par \par Now POD#7 s/p revision of BBR and excision of painful scars. \par \par - dressings changed\par - may shower\par - daily Aquaphor and Scarguard in 1 week \par - post-op instructions reviewed and all questions were answered\par - f/u 2 weeks with Dr. Morton. \par \par Due to COVID 19, pre-visit patient instructions were explained to the patient and their symptoms were checked upon arrival.  Masks were used by the health care providers and staff and the examination room was cleaned after the patient visit was completed.\par \par 4/4/2023\par seen w father and Trudi\par doing well overall\par slight resolving bruising left inferior breast\par normal postop swelling\par right side not as swollen, as expected\par \par stop aquafor\par silicon patch\par resume low level dance in 2 weeks, gradually increasing to normal 10-14 days thereafter\par f/u 2 months\par \par pt happy overall\par took "few pain pills" first two days after surgery, none since\par \par 5/30/2023\par as above\par two months postop\par pt seen w her sister and Trudi\par \par on exam scarring acceptable\par pt concnered w right side being slightlyu more sparks than left\par reassurance given that swlling will go down as it is only 2 months afetr surgery\par \par cont silicon tape / scarguard\par \par f/u spet 2023\par \par all ?s asnwered\par \par \par \par \par \par \par \par \par \par \par \par

## 2023-09-07 ENCOUNTER — APPOINTMENT (OUTPATIENT)
Dept: PLASTIC SURGERY | Facility: CLINIC | Age: 23
End: 2023-09-07
Payer: COMMERCIAL

## 2023-09-07 PROCEDURE — 99212 OFFICE O/P EST SF 10 MIN: CPT

## 2023-09-07 NOTE — ASSESSMENT
[FreeTextEntry1] : 22 yo healthy F with symptomatic macromastia s/p BBR. Doing well.  Breast asymmetry and painful scars.   Now POD#7 s/p revision of BBR and excision of painful scars.   - dressings changed - may shower - daily Aquaphor and Scarguard in 1 week  - post-op instructions reviewed and all questions were answered - f/u 2 weeks with Dr. Morton.   Due to COVID 19, pre-visit patient instructions were explained to the patient and their symptoms were checked upon arrival.  Masks were used by the health care providers and staff and the examination room was cleaned after the patient visit was completed.  4/4/2023 seen w  and Trudi doing well overall slight resolving bruising left inferior breast normal postop swelling right side not as swollen, as expected  stop aquafor silicon patch resume low level dance in 2 weeks, gradually increasing to normal 10-14 days thereafter f/u 2 months  pt happy overall took "few pain pills" first two days after surgery, none since  5/30/2023 as above two months postop pt seen w her sister and Trudi  on exam scarring acceptable pt concnered w right side being slightlyu more sparks than left reassurance given that swlling will go down as it is only 2 months afetr surgery  cont silicon tape / scarguard  f/u spet 2023  all ?s asnwered   9/7/2023 as above pt seen w father pt remains actively dancing primary concern is right side is slightly sparks than left          secondarily left NAC slightly higher both of these may be able to be adjusted in the office when he dancing schedule permits (several hours per day) some areas of her scarring have thickened (from 12 to 3 on left NAC for example)  cont silicon tape  f/u Jan 2024 to finalize plan for office revision / excision of tender scar tissue  pt seen w father and DARLEEN Davalosuca  all ?s answered  prior photos reviewed no photos taken today

## 2023-09-07 NOTE — DATA REVIEWED
[FreeTextEntry1] : Pathology             Final\par  \par  No Documents Attached\par  \par  \par  \par    [facility] Accession Number : [id number]\par  Patient:   MEHRAN MAYES\par  \par  \par  Accession:                             [id number]\par  \par  Collected Date/Time:                   3/13/2023 16:40 EDT\par  Received Date/Time:                    3/14/2023 08:31 EDT\par  \par  Surgical Pathology Report - Auth (Verified)\par  \par  Specimen(s) Submitted\par  1  Right breast scar tissue\par  Time obtained: 4:40 pm\par  Cold ischemic time <1 hour\par  2  Left breast scar tissue\par  Time obtained: 4:45 pm\par  Cold ischemic time <1 hour\par  \par  Final Diagnosis\par  \par  1  Right breast scar tissue, excision:\par  -Skin showing dermal fibrosis, and focal scar with focal foreign body type\par  multinucleate giant cell reaction.\par  -Benign fatty breast tissue showing non-proliferative fibrocystic change\par  \par  2  Left breast scar tissue, excision:\par  -Skin showing dermal fibrosis and focal scar.\par  -Benign fatty breast tissue showing non-proliferative fibrocystic change\par  Verified by: Patrica Ramachandran M.D.\par  (Electronic Signature)\par  Reported on: 03/16/23 16:11 EDT, [facility],\par  [facility] [place] [address]\par  Phone: [phone]   Fax: [phone]\par  _________________________________________________________________\par  \par  \par  Clinical Information\par  Excision bilateral breast scar tissue\par  prior breast reduction\par  \par  Perioperative Diagnosis\par  S/P bilateral breast reduction\par  \par  Gross Description\par  1.  The specimen is received fresh labeled "right breast the scar tissue".\par  It consists of multiple fragments of tan-white skin and subcutaneous\par  tissue measuring 10 x 6 x 1.5 cm in aggregate.  There are 2 scars\par  identified measuring 1.5 x 0.3 cm., 1 x 0.2 cm. Representative sections\par  are submitted.\par  (2 blocks)\par  \par  2.  The specimen is received fresh labeled "left breast the scar tissue".\par  It consists of multiple fragments of tan-white skin and underlying\par  soft tissue measuring 7 x 4 x 2 cm in aggregate.  There are multiple\par  \par  scars identified measuring 4.5 x 0.3 cm, 1.3 x 0.3 cm, 0.7 x 0.3 cm.\par  Representative sections are submitted.  (2 blocks)\par  \par  Specimen was received and underwent gross examination at [facility], [address] [place] [address].\par  \par  03/14/2023 19:56:07 EDT   \par  \par   \par  \par   Ordered by: ELBA BALDERAS IV       Collected/Examined: 13Mar2023 04:40PM       \par  Verification Required       Stage: Final       \par   Performed at: [facility]       Resulted: 16Mar2023 04:11PM       Last Updated: 16Mar2023 04:11PM       Accession: [id number]

## 2023-09-19 ENCOUNTER — APPOINTMENT (OUTPATIENT)
Dept: OBGYN | Facility: CLINIC | Age: 23
End: 2023-09-19
Payer: COMMERCIAL

## 2023-09-19 ENCOUNTER — NON-APPOINTMENT (OUTPATIENT)
Age: 23
End: 2023-09-19

## 2023-09-19 VITALS
HEIGHT: 68 IN | DIASTOLIC BLOOD PRESSURE: 80 MMHG | HEART RATE: 79 BPM | BODY MASS INDEX: 21.22 KG/M2 | OXYGEN SATURATION: 99 % | WEIGHT: 140 LBS | SYSTOLIC BLOOD PRESSURE: 116 MMHG | TEMPERATURE: 97.3 F

## 2023-09-19 DIAGNOSIS — Z01.419 ENCOUNTER FOR GYNECOLOGICAL EXAMINATION (GENERAL) (ROUTINE) W/OUT ABNORMAL FINDINGS: ICD-10-CM

## 2023-09-19 DIAGNOSIS — N89.8 OTHER SPECIFIED NONINFLAMMATORY DISORDERS OF VAGINA: ICD-10-CM

## 2023-09-19 DIAGNOSIS — N92.6 IRREGULAR MENSTRUATION, UNSPECIFIED: ICD-10-CM

## 2023-09-19 LAB
HCG UR QL: NEGATIVE
QUALITY CONTROL: YES

## 2023-09-19 PROCEDURE — 99385 PREV VISIT NEW AGE 18-39: CPT

## 2023-09-21 LAB
C TRACH RRNA SPEC QL NAA+PROBE: NOT DETECTED
CANDIDA VAG CYTO: DETECTED
G VAGINALIS+PREV SP MTYP VAG QL MICRO: NOT DETECTED
N GONORRHOEA RRNA SPEC QL NAA+PROBE: NOT DETECTED
SOURCE TP AMPLIFICATION: NORMAL
T VAGINALIS VAG QL WET PREP: NOT DETECTED

## 2023-09-21 RX ORDER — CLOTRIMAZOLE AND BETAMETHASONE DIPROPIONATE 10; .5 MG/G; MG/G
1-0.05 CREAM TOPICAL 3 TIMES DAILY
Qty: 1 | Refills: 1 | Status: ACTIVE | COMMUNITY
Start: 2023-09-21 | End: 1900-01-01

## 2023-09-21 RX ORDER — FLUCONAZOLE 150 MG/1
150 TABLET ORAL
Qty: 2 | Refills: 2 | Status: ACTIVE | COMMUNITY
Start: 2023-09-21 | End: 1900-01-01

## 2023-09-22 LAB — CYTOLOGY CVX/VAG DOC THIN PREP: ABNORMAL

## 2024-01-04 ENCOUNTER — APPOINTMENT (OUTPATIENT)
Dept: PLASTIC SURGERY | Facility: CLINIC | Age: 24
End: 2024-01-04

## 2024-01-16 ENCOUNTER — APPOINTMENT (OUTPATIENT)
Dept: ORTHOPEDIC SURGERY | Facility: CLINIC | Age: 24
End: 2024-01-16
Payer: COMMERCIAL

## 2024-01-16 VITALS — BODY MASS INDEX: 21.22 KG/M2 | HEIGHT: 68 IN | WEIGHT: 140 LBS

## 2024-01-16 DIAGNOSIS — M65.9 SYNOVITIS AND TENOSYNOVITIS, UNSPECIFIED: ICD-10-CM

## 2024-01-16 DIAGNOSIS — M25.531 PAIN IN RIGHT WRIST: ICD-10-CM

## 2024-01-16 DIAGNOSIS — S63.501A UNSPECIFIED SPRAIN OF RIGHT WRIST, INITIAL ENCOUNTER: ICD-10-CM

## 2024-01-16 DIAGNOSIS — K90.0 CELIAC DISEASE: ICD-10-CM

## 2024-01-16 PROCEDURE — 99203 OFFICE O/P NEW LOW 30 MIN: CPT

## 2024-01-16 PROCEDURE — 73110 X-RAY EXAM OF WRIST: CPT | Mod: RT

## 2024-01-16 NOTE — DISCUSSION/SUMMARY
[de-identified] : Discussed the nature of the diagnosis and risk and benefits of different modalities of treatment. X-rays reveiwed and discussed. As she has a family gx of RA and has pain in the wrist w/out trauma, recommended consultation with rheumatology. Activity modification, NWB RT wrist. Cock up splint. Warm compress and NSAIDs. RTO 3 weeks.

## 2024-01-16 NOTE — HISTORY OF PRESENT ILLNESS
[7] : 7 [3] : 3 [Dull/Aching] : dull/aching [Localized] : localized [Throbbing] : throbbing [de-identified] : 23 year old female presenting with RIGHT ulnar sided wrist pain for the past 2 weeks. No injury/trauma. No intervention. Patient is a dancer.   She has a family hx of RA in her maternal grandmother.  [] : no [FreeTextEntry1] : RT hand [FreeTextEntry4] : 2 weeks [FreeTextEntry5] : 23 yr old female c/o RT hand pain that developed 2 weeks ago., worsen 6 days ago. 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 Denies receiving prior treatment. Denies specific injury/trauma.

## 2024-01-16 NOTE — PHYSICAL EXAM
[Dorsal Wrist] : dorsal wrist [TFCC] : TFCC [UJ] : UJ [Right] : right wrist [No acute displaced fracture or dislocation] : No acute displaced fracture or dislocation [] : no erythema [de-identified] : Piso triquetral tenderness, mild ECU tenderness, [FreeTextEntry9] : pain at terminal flexion and extension  [de-identified] : No appreciated instability

## 2024-02-12 ENCOUNTER — APPOINTMENT (OUTPATIENT)
Dept: ORTHOPEDIC SURGERY | Facility: CLINIC | Age: 24
End: 2024-02-12

## 2024-02-22 RX ORDER — NORETHINDRONE ACETATE AND ETHINYL ESTRADIOL, ETHINYL ESTRADIOL AND FERROUS FUMARATE 1MG-10(24)
1 MG-10 MCG / KIT ORAL
Qty: 3 | Refills: 1 | Status: ACTIVE | COMMUNITY
Start: 2023-09-19 | End: 1900-01-01

## 2024-04-23 ENCOUNTER — APPOINTMENT (OUTPATIENT)
Dept: OBGYN | Facility: CLINIC | Age: 24
End: 2024-04-23
Payer: COMMERCIAL

## 2024-04-23 VITALS
HEART RATE: 73 BPM | HEIGHT: 68 IN | WEIGHT: 140 LBS | DIASTOLIC BLOOD PRESSURE: 60 MMHG | SYSTOLIC BLOOD PRESSURE: 104 MMHG | BODY MASS INDEX: 21.22 KG/M2 | OXYGEN SATURATION: 98 % | TEMPERATURE: 96.7 F

## 2024-04-23 DIAGNOSIS — Z30.41 ENCOUNTER FOR SURVEILLANCE OF CONTRACEPTIVE PILLS: ICD-10-CM

## 2024-04-23 PROCEDURE — 99212 OFFICE O/P EST SF 10 MIN: CPT

## 2024-04-23 RX ORDER — NORETHINDRONE ACETATE AND ETHINYL ESTRADIOL AND FERROUS FUMARATE 1MG-20(21)
1-20 KIT ORAL
Qty: 84 | Refills: 0 | Status: ACTIVE | COMMUNITY
Start: 2024-04-23 | End: 1900-01-01

## 2024-04-23 NOTE — PHYSICAL EXAM
[Chaperone Declined] : Patient declined chaperone [Appropriately responsive] : appropriately responsive [Alert] : alert [No Acute Distress] : no acute distress [FreeTextEntry4] : Color pink, no distress, [FreeTextEntry5] : Resp. rate wnl, color pink, no SOB

## 2024-04-23 NOTE — REVIEW OF SYSTEMS
[Negative] : Heme/Lymph [Urgency] : no urgency [Frequency] : no frequency [Incontinence] : no incontinence [Dysuria] : no dysuria [Urethral Discharge] : no urethral discharge [Abn Vaginal bleeding] : abnormal vaginal bleeding [Pelvic pain] : no pelvic pain [CVA Pain] : no CVA pain [Genital Rash/Irritation] : no genital rash/irritation

## 2024-04-23 NOTE — HISTORY OF PRESENT ILLNESS
[FreeTextEntry1] : 23yo here today to discuss birth control.  Pt. is currently on loloestrin and is having issues with breakthrough bleeding which has been ongoing.  Pt. is in long distance relationship and has not had intercourse but states her boyfriend will be graduating and coming home and would like to be safe.  Pt. states she will use condoms as well.  Pt. had breast revision in March and is happy with results

## 2024-08-15 ENCOUNTER — APPOINTMENT (OUTPATIENT)
Dept: OBGYN | Facility: CLINIC | Age: 24
End: 2024-08-15

## 2024-08-15 VITALS
HEART RATE: 74 BPM | TEMPERATURE: 97.3 F | BODY MASS INDEX: 21.22 KG/M2 | WEIGHT: 140 LBS | OXYGEN SATURATION: 98 % | SYSTOLIC BLOOD PRESSURE: 116 MMHG | HEIGHT: 68 IN | DIASTOLIC BLOOD PRESSURE: 70 MMHG

## 2024-08-15 DIAGNOSIS — Z30.41 ENCOUNTER FOR SURVEILLANCE OF CONTRACEPTIVE PILLS: ICD-10-CM

## 2024-08-15 DIAGNOSIS — R39.9 UNSPECIFIED SYMPTOMS AND SIGNS INVOLVING THE GENITOURINARY SYSTEM: ICD-10-CM

## 2024-08-15 DIAGNOSIS — R35.0 FREQUENCY OF MICTURITION: ICD-10-CM

## 2024-08-15 LAB
BILIRUB UR QL STRIP: NEGATIVE
CLARITY UR: CLEAR
COLLECTION METHOD: NORMAL
GLUCOSE UR-MCNC: NEGATIVE
HCG UR QL: 0.2 EU/DL
HGB UR QL STRIP.AUTO: NEGATIVE
KETONES UR-MCNC: NEGATIVE
LEUKOCYTE ESTERASE UR QL STRIP: NEGATIVE
NITRITE UR QL STRIP: NEGATIVE
PH UR STRIP: 7
PROT UR STRIP-MCNC: NEGATIVE
SP GR UR STRIP: 1.02

## 2024-08-15 PROCEDURE — 99213 OFFICE O/P EST LOW 20 MIN: CPT

## 2024-08-15 PROCEDURE — 81003 URINALYSIS AUTO W/O SCOPE: CPT | Mod: QW

## 2024-08-15 NOTE — HISTORY OF PRESENT ILLNESS
[FreeTextEntry1] : 23yo presents with c/o urinary frequency and low back pain.  - fever, -chills, -pelvic pain.  Pt. states she was unhappy with Junel 1/20 r/t "breasts got bigger" and she felt bloated so she went back to loloestrin and states she feels good on it but does occasionally spot for a day or 2 mid cylce.  Not using condoms, same long term partner.   <<<<<<<<<<<<<<<<<<<<<<<<<<<<<<<<<<<<<<<<<<<<<<<<<<<<<<<<<<<<<<<<<<<<<<<<<<<<<<<<<<<<<<<<<<<<<<<<<<<<<<<<<<<<<<<<<<<<<<<<<<<<<<<<<<<<<<4/2024 changed from loloestrin to June 1/20   <<<<<<<<<<<<<<<<<<<<9/2023 New pt. exam 24yo G0, LMP 9/15/2023 presents for establishment of care. Pt. reports that she is in a long distance/long term relationship and when they do get back together, each intial encounter she has issue with vaginal tear at 6:00 introitus that does resolve on it's own. Pt. states she does use lubricant, is on loloestrin for contraception and cycle regulation, having irregular cycles in the past every 40-60 days. Pt. 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 Pt. currently unhappy with breast symmetry and would like reference for plastics  PT. with vasovagal episode today after consult and exam. Pt. never lose consciousness but did turn pale and was lightheaded/dizzy/nauseous. Pt. made full recovery, vss, return of color in approximately 5 min. after episode. Pt. was monitored for an additional 20 minutes, given sweet tea. Pt. reported that she felt better and was fine to leave the office. Pt. called her mother during this time and I did make her mother aware of what transpired at pt's request. Upon leaving pt's color was pink, /80, RR 16, HR 98.  Allergies: Glutton Current Meds: loloestrin Gyn Hx: -sexually active -prefers male -using cocps for contraception and cycle regulation -does not use condoms -age of first intercourse 19 -s/p gardasil vaccine -1st period age 15 -cycle is regular on cocps but prior to the pill cycles were irregular every 30-60 days  Med Hx: -anxiety/vasovagal -celiac disease  Sx Hx -breast redux  Family Hx: denies  Social Hx: -tobacco, -alcohol, -rec drugs

## 2024-08-15 NOTE — PHYSICAL EXAM
[Chaperone Declined] : Patient declined chaperone [Appropriately responsive] : appropriately responsive [Alert] : alert [No Acute Distress] : no acute distress [Soft] : soft [Non-tender] : non-tender [Non-distended] : non-distended [Oriented x3] : oriented x3 [Labia Majora] : normal [Labia Minora] : normal [Normal] : normal [Uterine Adnexae] : normal [FreeTextEntry4] : Color pink, no distress, [FreeTextEntry5] : Resp. rate wnl, color pink, no SOB

## 2024-08-16 LAB
C TRACH RRNA SPEC QL NAA+PROBE: NOT DETECTED
CANDIDA VAG CYTO: NOT DETECTED
G VAGINALIS+PREV SP MTYP VAG QL MICRO: NOT DETECTED
N GONORRHOEA RRNA SPEC QL NAA+PROBE: NOT DETECTED
SOURCE AMPLIFICATION: NORMAL
T VAGINALIS VAG QL WET PREP: NOT DETECTED

## 2024-08-16 RX ORDER — NITROFURANTOIN (MONOHYDRATE/MACROCRYSTALS) 25; 75 MG/1; MG/1
100 CAPSULE ORAL
Qty: 10 | Refills: 0 | Status: ACTIVE | COMMUNITY
Start: 2024-08-16 | End: 1900-01-01

## 2024-08-19 LAB — BACTERIA UR CULT: NORMAL

## 2024-09-20 ENCOUNTER — APPOINTMENT (OUTPATIENT)
Dept: OBGYN | Facility: CLINIC | Age: 24
End: 2024-09-20

## 2024-11-19 ENCOUNTER — APPOINTMENT (OUTPATIENT)
Dept: OBGYN | Facility: CLINIC | Age: 24
End: 2024-11-19
Payer: COMMERCIAL

## 2024-11-19 VITALS
RESPIRATION RATE: 14 BRPM | SYSTOLIC BLOOD PRESSURE: 118 MMHG | DIASTOLIC BLOOD PRESSURE: 68 MMHG | WEIGHT: 140 LBS | OXYGEN SATURATION: 99 % | BODY MASS INDEX: 21.22 KG/M2 | HEART RATE: 82 BPM | HEIGHT: 68 IN

## 2024-11-19 DIAGNOSIS — Z01.419 ENCOUNTER FOR GYNECOLOGICAL EXAMINATION (GENERAL) (ROUTINE) W/OUT ABNORMAL FINDINGS: ICD-10-CM

## 2024-11-19 DIAGNOSIS — Z30.41 ENCOUNTER FOR SURVEILLANCE OF CONTRACEPTIVE PILLS: ICD-10-CM

## 2024-11-19 PROCEDURE — 99395 PREV VISIT EST AGE 18-39: CPT

## 2024-11-24 LAB — CYTOLOGY CVX/VAG DOC THIN PREP: NORMAL

## 2025-05-29 ENCOUNTER — RESULT REVIEW (OUTPATIENT)
Age: 25
End: 2025-05-29

## 2025-05-29 ENCOUNTER — OUTPATIENT (OUTPATIENT)
Dept: INPATIENT UNIT | Facility: HOSPITAL | Age: 25
LOS: 1 days | End: 2025-05-29
Payer: SELF-PAY

## 2025-05-29 ENCOUNTER — TRANSCRIPTION ENCOUNTER (OUTPATIENT)
Age: 25
End: 2025-05-29

## 2025-05-29 VITALS
WEIGHT: 145.06 LBS | TEMPERATURE: 98 F | OXYGEN SATURATION: 98 % | DIASTOLIC BLOOD PRESSURE: 79 MMHG | HEART RATE: 81 BPM | RESPIRATION RATE: 16 BRPM | HEIGHT: 68 IN | SYSTOLIC BLOOD PRESSURE: 109 MMHG

## 2025-05-29 VITALS
RESPIRATION RATE: 16 BRPM | OXYGEN SATURATION: 100 % | HEART RATE: 85 BPM | SYSTOLIC BLOOD PRESSURE: 124 MMHG | DIASTOLIC BLOOD PRESSURE: 58 MMHG

## 2025-05-29 DIAGNOSIS — Z41.1 ENCOUNTER FOR COSMETIC SURGERY: ICD-10-CM

## 2025-05-29 RX ORDER — SERTRALINE 100 MG/1
1 TABLET, FILM COATED ORAL
Refills: 0 | DISCHARGE

## 2025-05-29 RX ORDER — SODIUM CHLORIDE 9 G/1000ML
1000 INJECTION, SOLUTION INTRAVENOUS
Refills: 0 | Status: DISCONTINUED | OUTPATIENT
Start: 2025-05-29 | End: 2025-05-29

## 2025-05-29 RX ORDER — ONDANSETRON HCL/PF 4 MG/2 ML
4 VIAL (ML) INJECTION ONCE
Refills: 0 | Status: DISCONTINUED | OUTPATIENT
Start: 2025-05-29 | End: 2025-05-29

## 2025-05-29 RX ORDER — HYDROMORPHONE/SOD CHLOR,ISO/PF 2 MG/10 ML
0.5 SYRINGE (ML) INJECTION
Refills: 0 | Status: DISCONTINUED | OUTPATIENT
Start: 2025-05-29 | End: 2025-05-29

## 2025-05-29 RX ADMIN — Medication 0.5 MILLIGRAM(S): at 12:15

## 2025-05-29 NOTE — H&P ADULT - IN ACCORDANCE WITH [place] STATE LAW, WE OFFER EVERY PATIENT A HEPATITIS C TEST. WOULD YOU LIKE TO BE TESTED TODAY?
Check with patient (hearing impaired - just so you know when contacting) - I thought she had issues with other nasal sprays in the past so we did prior auth for the mometasone.   Opt out

## 2025-05-29 NOTE — ASU DISCHARGE PLAN (ADULT/PEDIATRIC) - CARE PROVIDER_API CALL
[facility]  Plastic Surgery  [address] [place] [address], [facility] [address]  [place] [address]  Phone: [phone]  Fax: [phone]  Follow Up Time:

## 2025-05-29 NOTE — ASU DISCHARGE PLAN (ADULT/PEDIATRIC) - FINANCIAL ASSISTANCE
[facility] provides services at a reduced cost to those who are determined to be eligible through [facility]’s financial assistance program. Information regarding [facility]’s financial assistance program can be found by going to [email or web address] or by calling [phone].

## 2025-05-29 NOTE — ASU PATIENT PROFILE, ADULT - MEDICATIONS BROUGHT TO HOSPITAL, PROFILE
Health Call Center    Phone Message    May a detailed message be left on voicemail: yes    Reason for Call: Other: Pt calling asking for orders for x-ray in right lower leg. Per pt Dr. Downs requested it. pt will be at the [facility] on 9/18 and would like to schedule it as soon as possible. Please call pt when orders are placed.      Action Taken: Message routed to:  [facility] ([facility]):  infectious disease   no

## 2025-05-29 NOTE — ASU DISCHARGE PLAN (ADULT/PEDIATRIC) - CARE COORDINATION DISCHARGE PLANNING
had cholestasis of pregnancy   now fine  needs followup to make sure she can have another baby   no family history   raare alcoholl  baby fine  alk phos increased
No

## 2025-05-29 NOTE — PACU DISCHARGE NOTE - AIRWAY PATENCY:
Satisfactory Render In Strict Bullet Format?: No Plan: Discussed sensitive skin protocol. Recommend fragrance free cleanser, moisturizer and hypoallergenic detergent. Can take OTC antihistamine daily. Initiate Treatment: Triamcinolone to AA BID X2 weeks then prn flares Detail Level: Zone

## 2025-05-29 NOTE — ASU PATIENT PROFILE, ADULT - FALL HARM RISK - UNIVERSAL INTERVENTIONS
Bed in lowest position, wheels locked, appropriate side rails in place/Call bell, personal items and telephone in reach/Instruct patient to call for assistance before getting out of bed or chair/Non-slip footwear when patient is out of bed/[place] to call system/Physically safe environment - no spills, clutter or unnecessary equipment/Purposeful Proactive Rounding/Room/bathroom lighting operational, light cord in reach

## 2025-06-03 LAB — SURGICAL PATHOLOGY STUDY: SIGNIFICANT CHANGE UP

## (undated) DEVICE — DRAPE MAYO STAND 30"

## (undated) DEVICE — GLV 7.5 PROTEXIS (WHITE)

## (undated) DEVICE — GLV 8 PROTEXIS (WHITE)

## (undated) DEVICE — LAP PAD 18 X 18"

## (undated) DEVICE — BLADE SCALPEL SAFETYLOCK #15

## (undated) DEVICE — TUBING INFILTRATION

## (undated) DEVICE — DRSG COMBINE 5 X 9"

## (undated) DEVICE — DRAPE TOWEL BLUE 17" X 24"

## (undated) DEVICE — WARMING BLANKET LOWER ADULT

## (undated) DEVICE — TUBING ASPIRATION HI VAC LIPOSUCTION 8FT

## (undated) DEVICE — POSITIONER FOAM EGG CRATE ULNAR 2PCS (PINK)

## (undated) DEVICE — STAPLER SKIN VISI-STAT 35 WIDE

## (undated) DEVICE — DRAPE INSTRUMENT POUCH 6.75" X 11"

## (undated) DEVICE — GLV 6.5 PROTEXIS (WHITE)

## (undated) DEVICE — SOL IRR POUR NS 0.9% 500ML

## (undated) DEVICE — SOL IRR POUR H2O 250ML

## (undated) DEVICE — DRAIN RESERVOIR FOR JACKSON PRATT 100CC CARDINAL

## (undated) DEVICE — DRAPE LIGHT HANDLE COVER (BLUE)

## (undated) DEVICE — VISITEC 4X4

## (undated) DEVICE — GLV 7 PROTEXIS (WHITE)

## (undated) DEVICE — TUBING SUCTION 20FT

## (undated) DEVICE — DRAIN JACKSON PRATT 10MM FLAT FULL NO TROCAR

## (undated) DEVICE — MEDICATION LABELS W MARKER

## (undated) DEVICE — WARMING BLANKET UPPER ADULT

## (undated) DEVICE — VENODYNE/SCD SLEEVE CALF MEDIUM

## (undated) DEVICE — SPECIMEN CONTAINER 100ML

## (undated) DEVICE — PREP CHLORAPREP HI-LITE ORANGE 26ML

## (undated) DEVICE — GLV 8.5 PROTEXIS (WHITE)

## (undated) DEVICE — MARKING PEN W RULER

## (undated) DEVICE — BLADE SCALPEL SAFETYLOCK #10

## (undated) DEVICE — ONETRAC LIGHTED RETRACTOR 135 X 30MM DISP

## (undated) DEVICE — DRAIN JACKSON PRATT 7MM FLAT FULL NO TROCAR

## (undated) DEVICE — GOWN TRIMAX LG